# Patient Record
Sex: FEMALE | Race: WHITE | NOT HISPANIC OR LATINO | Employment: OTHER | URBAN - METROPOLITAN AREA
[De-identification: names, ages, dates, MRNs, and addresses within clinical notes are randomized per-mention and may not be internally consistent; named-entity substitution may affect disease eponyms.]

---

## 2017-02-23 ENCOUNTER — OFFICE VISIT - HEALTHEAST (OUTPATIENT)
Dept: FAMILY MEDICINE | Facility: CLINIC | Age: 57
End: 2017-02-23

## 2017-02-23 DIAGNOSIS — S83.207A ACUTE MENISCAL TEAR OF LEFT KNEE, INITIAL ENCOUNTER: ICD-10-CM

## 2017-02-23 DIAGNOSIS — Z01.818 PRE-OP EXAM: ICD-10-CM

## 2017-06-30 ENCOUNTER — RECORDS - HEALTHEAST (OUTPATIENT)
Dept: ADMINISTRATIVE | Facility: OTHER | Age: 57
End: 2017-06-30

## 2017-08-29 ENCOUNTER — OFFICE VISIT - HEALTHEAST (OUTPATIENT)
Dept: SLEEP MEDICINE | Facility: CLINIC | Age: 57
End: 2017-08-29

## 2017-08-29 DIAGNOSIS — R29.818 SUSPECTED SLEEP APNEA: ICD-10-CM

## 2017-08-29 DIAGNOSIS — G47.10 HYPERSOMNIA, UNSPECIFIED: ICD-10-CM

## 2017-08-29 DIAGNOSIS — G47.8 SLEEP DYSFUNCTION WITH SLEEP STAGE DISTURBANCE: ICD-10-CM

## 2017-08-29 DIAGNOSIS — R06.83 SNORING: ICD-10-CM

## 2017-08-29 ASSESSMENT — MIFFLIN-ST. JEOR: SCORE: 1815.1

## 2017-09-12 ENCOUNTER — RECORDS - HEALTHEAST (OUTPATIENT)
Dept: ADMINISTRATIVE | Facility: OTHER | Age: 57
End: 2017-09-12

## 2017-09-12 ENCOUNTER — RECORDS - HEALTHEAST (OUTPATIENT)
Dept: SLEEP MEDICINE | Facility: CLINIC | Age: 57
End: 2017-09-12

## 2017-09-12 DIAGNOSIS — G47.10 HYPERSOMNIA, UNSPECIFIED: ICD-10-CM

## 2017-09-12 DIAGNOSIS — R06.83 SNORING: ICD-10-CM

## 2017-09-12 DIAGNOSIS — G47.30 SLEEP APNEA, UNSPECIFIED: ICD-10-CM

## 2017-09-12 DIAGNOSIS — G47.8 OTHER SLEEP DISORDERS: ICD-10-CM

## 2017-09-14 ENCOUNTER — COMMUNICATION - HEALTHEAST (OUTPATIENT)
Dept: SLEEP MEDICINE | Facility: CLINIC | Age: 57
End: 2017-09-14

## 2017-09-14 DIAGNOSIS — R09.02 HYPOXIA: ICD-10-CM

## 2017-09-14 DIAGNOSIS — G47.33 OBSTRUCTIVE SLEEP APNEA (ADULT) (PEDIATRIC): ICD-10-CM

## 2017-09-19 ENCOUNTER — COMMUNICATION - HEALTHEAST (OUTPATIENT)
Dept: SLEEP MEDICINE | Facility: CLINIC | Age: 57
End: 2017-09-19

## 2017-10-01 ENCOUNTER — RECORDS - HEALTHEAST (OUTPATIENT)
Dept: ADMINISTRATIVE | Facility: OTHER | Age: 57
End: 2017-10-01

## 2017-10-01 ENCOUNTER — RECORDS - HEALTHEAST (OUTPATIENT)
Dept: SLEEP MEDICINE | Age: 57
End: 2017-10-01

## 2017-10-01 DIAGNOSIS — R09.02 HYPOXEMIA: ICD-10-CM

## 2017-10-01 DIAGNOSIS — G47.33 OBSTRUCTIVE SLEEP APNEA (ADULT) (PEDIATRIC): ICD-10-CM

## 2017-10-06 ENCOUNTER — AMBULATORY - HEALTHEAST (OUTPATIENT)
Dept: SLEEP MEDICINE | Facility: CLINIC | Age: 57
End: 2017-10-06

## 2017-10-06 ENCOUNTER — COMMUNICATION - HEALTHEAST (OUTPATIENT)
Dept: SLEEP MEDICINE | Facility: CLINIC | Age: 57
End: 2017-10-06

## 2017-10-06 DIAGNOSIS — G47.10 HYPERSOMNIA: ICD-10-CM

## 2017-10-06 DIAGNOSIS — G47.33 OBSTRUCTIVE SLEEP APNEA: ICD-10-CM

## 2017-10-10 ENCOUNTER — AMBULATORY - HEALTHEAST (OUTPATIENT)
Dept: SLEEP MEDICINE | Facility: CLINIC | Age: 57
End: 2017-10-10

## 2017-11-15 ENCOUNTER — OFFICE VISIT - HEALTHEAST (OUTPATIENT)
Dept: SLEEP MEDICINE | Facility: CLINIC | Age: 57
End: 2017-11-15

## 2017-11-15 DIAGNOSIS — E66.9 OBESITY: ICD-10-CM

## 2017-11-15 DIAGNOSIS — G47.8 SLEEP DYSFUNCTION WITH SLEEP STAGE DISTURBANCE: ICD-10-CM

## 2017-11-15 DIAGNOSIS — G47.33 OSA ON CPAP: ICD-10-CM

## 2017-11-15 DIAGNOSIS — G47.10 HYPERSOMNIA: ICD-10-CM

## 2017-11-15 ASSESSMENT — MIFFLIN-ST. JEOR: SCORE: 1864.09

## 2018-02-08 ENCOUNTER — OFFICE VISIT - HEALTHEAST (OUTPATIENT)
Dept: SLEEP MEDICINE | Facility: CLINIC | Age: 58
End: 2018-02-08

## 2018-02-08 DIAGNOSIS — G47.33 OSA ON CPAP: ICD-10-CM

## 2018-02-08 DIAGNOSIS — G47.10 HYPERSOMNIA: ICD-10-CM

## 2018-02-08 DIAGNOSIS — G47.8 SLEEP DYSFUNCTION WITH SLEEP STAGE DISTURBANCE: ICD-10-CM

## 2018-08-10 ENCOUNTER — RECORDS - HEALTHEAST (OUTPATIENT)
Dept: ADMINISTRATIVE | Facility: OTHER | Age: 58
End: 2018-08-10

## 2019-10-02 ENCOUNTER — RECORDS - HEALTHEAST (OUTPATIENT)
Dept: ADMINISTRATIVE | Facility: OTHER | Age: 59
End: 2019-10-02

## 2019-12-05 ENCOUNTER — RECORDS - HEALTHEAST (OUTPATIENT)
Dept: ADMINISTRATIVE | Facility: OTHER | Age: 59
End: 2019-12-05

## 2019-12-09 ENCOUNTER — TRANSFERRED RECORDS (OUTPATIENT)
Dept: HEALTH INFORMATION MANAGEMENT | Facility: CLINIC | Age: 59
End: 2019-12-09

## 2020-10-28 ENCOUNTER — RECORDS - HEALTHEAST (OUTPATIENT)
Dept: ADMINISTRATIVE | Facility: OTHER | Age: 60
End: 2020-10-28

## 2021-01-11 DIAGNOSIS — G47.10 PERSISTENT DISORDER OF INITIATING OR MAINTAINING WAKEFULNESS: ICD-10-CM

## 2021-01-11 DIAGNOSIS — G47.33 OBSTRUCTIVE SLEEP APNEA (ADULT) (PEDIATRIC): Primary | ICD-10-CM

## 2021-05-21 ENCOUNTER — RECORDS - HEALTHEAST (OUTPATIENT)
Dept: ADMINISTRATIVE | Facility: OTHER | Age: 61
End: 2021-05-21

## 2021-05-21 ENCOUNTER — HOSPITAL ENCOUNTER (EMERGENCY)
Facility: CLINIC | Age: 61
Discharge: HOME | End: 2021-05-22
Attending: EMERGENCY MEDICINE
Payer: COMMERCIAL

## 2021-05-21 DIAGNOSIS — M25.511 ACUTE PAIN OF RIGHT SHOULDER: ICD-10-CM

## 2021-05-21 PROCEDURE — 99285 EMERGENCY DEPT VISIT HI MDM: CPT | Mod: 25 | Performed by: EMERGENCY MEDICINE

## 2021-05-21 PROCEDURE — 99284 EMERGENCY DEPT VISIT MOD MDM: CPT | Performed by: EMERGENCY MEDICINE

## 2021-05-22 ENCOUNTER — APPOINTMENT (OUTPATIENT)
Dept: CT IMAGING | Facility: CLINIC | Age: 61
End: 2021-05-22
Attending: EMERGENCY MEDICINE
Payer: COMMERCIAL

## 2021-05-22 ENCOUNTER — APPOINTMENT (OUTPATIENT)
Dept: GENERAL RADIOLOGY | Facility: CLINIC | Age: 61
End: 2021-05-22
Attending: EMERGENCY MEDICINE
Payer: COMMERCIAL

## 2021-05-22 VITALS
SYSTOLIC BLOOD PRESSURE: 188 MMHG | OXYGEN SATURATION: 100 % | HEART RATE: 71 BPM | TEMPERATURE: 98 F | RESPIRATION RATE: 18 BRPM | HEIGHT: 66 IN | DIASTOLIC BLOOD PRESSURE: 97 MMHG

## 2021-05-22 PROBLEM — Z90.49 S/P LAPAROSCOPIC CHOLECYSTECTOMY: Status: ACTIVE | Noted: 2021-05-22

## 2021-05-22 PROCEDURE — 96372 THER/PROPH/DIAG INJ SC/IM: CPT | Performed by: EMERGENCY MEDICINE

## 2021-05-22 PROCEDURE — 72125 CT NECK SPINE W/O DYE: CPT

## 2021-05-22 PROCEDURE — 73030 X-RAY EXAM OF SHOULDER: CPT | Mod: RT

## 2021-05-22 PROCEDURE — 250N000012 HC RX MED GY IP 250 OP 636 PS 637: Performed by: EMERGENCY MEDICINE

## 2021-05-22 PROCEDURE — 250N000011 HC RX IP 250 OP 636: Performed by: EMERGENCY MEDICINE

## 2021-05-22 RX ORDER — HYDROCODONE BITARTRATE AND ACETAMINOPHEN 5; 325 MG/1; MG/1
1 TABLET ORAL EVERY 6 HOURS PRN
Qty: 6 TABLET | Refills: 0 | Status: SHIPPED | OUTPATIENT
Start: 2021-05-22 | End: 2021-05-25

## 2021-05-22 RX ORDER — KETOROLAC TROMETHAMINE 30 MG/ML
30 INJECTION, SOLUTION INTRAMUSCULAR; INTRAVENOUS ONCE
Status: COMPLETED | OUTPATIENT
Start: 2021-05-22 | End: 2021-05-22

## 2021-05-22 RX ORDER — ONDANSETRON 4 MG/1
4 TABLET, ORALLY DISINTEGRATING ORAL EVERY 8 HOURS PRN
Qty: 10 TABLET | Refills: 0 | Status: SHIPPED | OUTPATIENT
Start: 2021-05-22 | End: 2021-05-25

## 2021-05-22 RX ORDER — METHOCARBAMOL 500 MG/1
TABLET, FILM COATED ORAL
COMMUNITY
Start: 2021-05-06 | End: 2022-02-17

## 2021-05-22 RX ORDER — CELECOXIB 200 MG/1
CAPSULE ORAL
COMMUNITY
Start: 2021-05-06

## 2021-05-22 RX ORDER — HYDROCODONE BITARTRATE AND ACETAMINOPHEN 5; 325 MG/1; MG/1
1 TABLET ORAL EVERY 6 HOURS PRN
Status: DISCONTINUED | OUTPATIENT
Start: 2021-05-22 | End: 2021-05-22 | Stop reason: HOSPADM

## 2021-05-22 RX ORDER — TRIAMCINOLONE ACETONIDE 40 MG/ML
40 INJECTION, SUSPENSION INTRA-ARTICULAR; INTRAMUSCULAR ONCE
Status: COMPLETED | OUTPATIENT
Start: 2021-05-22 | End: 2021-05-22

## 2021-05-22 RX ORDER — PREDNISONE 20 MG/1
40 TABLET ORAL ONCE
Status: COMPLETED | OUTPATIENT
Start: 2021-05-22 | End: 2021-05-22

## 2021-05-22 RX ORDER — METHYLPREDNISOLONE 4 MG
TABLET, DOSE PACK ORAL
Qty: 21 TABLET | Refills: 0 | Status: SHIPPED | OUTPATIENT
Start: 2021-05-22 | End: 2022-02-17

## 2021-05-22 RX ADMIN — PREDNISONE 40 MG: 20 TABLET ORAL at 00:09

## 2021-05-22 RX ADMIN — TRIAMCINOLONE ACETONIDE 40 MG: 40 INJECTION, SUSPENSION INTRA-ARTICULAR; INTRAMUSCULAR at 02:02

## 2021-05-22 RX ADMIN — KETOROLAC TROMETHAMINE 30 MG: 30 INJECTION, SOLUTION INTRAMUSCULAR at 00:09

## 2021-05-22 ASSESSMENT — ENCOUNTER SYMPTOMS
FEVER: 0
SHORTNESS OF BREATH: 0
WOUND: 0

## 2021-05-22 NOTE — ED TRIAGE NOTES
Pt went to the chiropractor and has severe right arm pain. Pain in the base of the neck all the way down her right arm.

## 2021-05-22 NOTE — ED PROVIDER NOTES
History     Chief Complaint   Patient presents with     Arm Pain     HPI  Alessia Thakur is a 61 year old female who presents to the emergency department with concerns regarding right upper extremity pain.  Symptoms began all of a sudden approximately 2 hours prior to emergency department arrival.  No inciting traumatic event.  No other inciting events prior to the onset of the right upper extremity pain.  This begins approximately in the supraclavicular/right neck region, radiating all the way down the right upper extremity to include all digits, front and back in addition to the entire right arm.  No left-sided symptoms.  Patient is left-hand dominant.  Denies any chest pain.  No visual changes, or headache.  Pain is slightly worsened with turning the head to the right, with no significant worsening pain with turning the head to the left.  Patient was at the chiropractor earlier, however states that no neck adjustment was performed.  Pain is worse with touching this area, in addition to movement.  Patient does not wish to move the arm at all.    Allergies:  No Known Allergies    Problem List:    Patient Active Problem List    Diagnosis Date Noted     S/P laparoscopic cholecystectomy 05/22/2021     Priority: Medium     Tubulovillous adenoma of colon 08/24/2016     Priority: Medium     Major depressive disorder with single episode 08/02/2016     Priority: Medium     Obesity 08/02/2016     Priority: Medium        Past Medical History:    No past medical history on file.    Past Surgical History:    No past surgical history on file.    Family History:    No family history on file.    Social History:  Marital Status:   [2]  Social History     Tobacco Use     Smoking status: Not on file   Substance Use Topics     Alcohol use: Not on file     Drug use: Not on file        Medications:    HYDROcodone-acetaminophen (NORCO) 5-325 MG tablet  methylPREDNISolone (MEDROL DOSEPAK) 4 MG tablet therapy pack  ondansetron  "(ZOFRAN ODT) 4 MG ODT tab  celecoxib (CELEBREX) 200 MG capsule  FLUoxetine (PROZAC) 20 MG capsule  methocarbamol (ROBAXIN) 500 MG tablet          Review of Systems   Constitutional: Negative for fever.   Respiratory: Negative for shortness of breath.    Cardiovascular: Negative for chest pain.   Musculoskeletal:        Right shoulder pain   Skin: Negative for rash and wound.   Neurological:        See HPI       Physical Exam   BP: (!) 188/97  Pulse: 71  Temp: 98  F (36.7  C)  Resp: 18  Height: 167.6 cm (5' 6\")  SpO2: 99 %      Physical Exam  BP (!) 188/97   Pulse 71   Temp 98  F (36.7  C) (Oral)   Resp 18   Ht 1.676 m (5' 6\")   SpO2 100%   General: alert and in moderate to severe distress c/o right shoulder/arm pain  Head: atraumatic, normocephalic  Abd: nondistended  Musculoskel/Extremities: Patient holding the right arm across her body in a protected position.  With movement of the neck towards the right, patient has increased amounts of sharp, stabbing pain down the right arm.  Even with minimal palpation in the supraclavicular region, patient jumps, complaining of severe pain.  She is tender in the biceps, and triceps region.  She has normal distal sensation, perfusion, capillary refill.  She has normal strength at the elbow, however patient hesitant to move the arm whatsoever because of pain.  However, with passive range of motion is able to move the shoulder some.  There is no midline neck tenderness to palpation.  She does not have significant paraspinous muscle tenderness of the neck region either.  Otherwise able to move her range of the neck in all directions.  No scapular type pain.  Denies tenderness with palpation of the chest.  Skin: no rashes, no diaphoresis and skin color normal  Neuro: Patient awake, alert, oriented, speech is fluent, gait is normal  Psychiatric: affect/mood normal,        ED Course        Procedures               Critical Care time:  none               Results for orders " placed or performed during the hospital encounter of 05/21/21 (from the past 24 hour(s))   Cervical spine CT w/o contrast    Narrative    EXAM: CT CERVICAL SPINE W/O CONTRAST  LOCATION: [facility]  DATE/TIME: 5/22/2021 12:14 AM    INDICATION: Cervical radiculopathy, no red flags  COMPARISON: None.  TECHNIQUE: Routine CT Cervical Spine without IV contrast. Multiplanar reformats. Dose reduction techniques were used.    FINDINGS:  VERTEBRA: No acute fracture. Straightening of the normal cervical lordosis. Alignment is otherwise normal. Mild loss of disc space height at C5-C6. Moderate to marked right C3-C4 and C4-C5 facet arthropathy and lower grade changes elsewhere.    CANAL/FORAMINA: No significant canal narrowing. Mild right C3-C4 and C4-C5 degenerative foraminal narrowing.    PARASPINAL: No extraspinal abnormality.      Impression    IMPRESSION:  1.  No fracture or posttraumatic subluxation.  2.  No high-grade spinal canal or neural foraminal stenosis.   Shoulder XR, 2 view, right    Narrative    EXAM: RIGHT SHOULDER 3 VIEW  LOCATION: [facility]  DATE/TIME: 5/22/2021 1:02 AM    INDICATION: Right shoulder pain.  COMPARISON: None.      Impression    IMPRESSION: No visualized acute fracture, malalignment or other acute osseous abnormality of the right shoulder. Note that all the images of the humerus are with the arm in internal rotation, limiting the evaluation of the lateral aspect of the humeral   head.       Medications   predniSONE (DELTASONE) tablet 40 mg (40 mg Oral Given 5/22/21 0009)   ketorolac (TORADOL) injection 30 mg (30 mg Intramuscular Given 5/22/21 0009)   triamcinolone (KENALOG-40) injection 40 mg (40 mg INTRA-ARTICULAR Given 5/22/21 0202)       Assessments & Plan (with Medical Decision Making)  61 year old female left hand dominant, presenting to the emergency department with  for concerns regarding right arm pain, in addition to supraclavicular area pain.  Patient  does have sharp, stabbing type pains that involve the entire right arm.  Does not appear to have specific nerve distribution, or dermatomal distribution.  Normal  strength of the right hand, with otherwise normal strength of the right upper extremity, however limited secondary to extreme amounts of pain for the patient.  Patient without rotator cuff issues, and no prior history of surgery of the right shoulder.  She does not have any chest pains that would be more concerning for cardiac etiology, and definitely does have some tenderness to palpation with even minimal palpation of the right upper arm area, and supraclavicular region.  This does appear to be more radicular type pains, and therefore decision made after discussion with patient, in addition to  to obtain CT imaging of the cervical spine.  MRI is not available at this time of night.  CT imaging showing no obvious acute abnormality.  Mild changes noted.  No high-grade foraminal stenoses are noted.    Given the above findings, patient had received Toradol, in addition to prednisone.  Decision made for x-ray of the shoulder, and x-ray of the shoulder  shows no evidence of fracture, or other acute osseous abnormality.  Images personally reviewed, in addition to radiology interpretation.    Patient still continued in extreme amounts of pain.  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  After discussion with patient's , decision made to allow him to inject right shoulder joint.  I was present during the entire procedure.  Kenalog with 3 cc of lidocaine 1% with epinephrine, in addition to 0.5% bupivacaine were injected locally via a subacromial intra-articular injection with subacromial bursa injection.  Patient's pain level went from 8 out of 10 down to a 6 out of 10 in severity.  At this point, no further interventions  thought to be necessary at this point.  I did discharge patient home with 6 tablets Norco.  Plan is for follow-up in clinic as needed, and return if worsening symptoms.     I have reviewed the nursing notes.    I have reviewed the findings, diagnosis, plan and need for follow up with the patient.       Discharge Medication List as of 5/22/2021  1:59 AM      START taking these medications    Details   HYDROcodone-acetaminophen (NORCO) 5-325 MG tablet Take 1 tablet by mouth every 6 hours as needed for pain, Disp-6 tablet, R-0, Local Print      methylPREDNISolone (MEDROL DOSEPAK) 4 MG tablet therapy pack Follow Package Directions, Disp-21 tablet, R-0, E-Prescribe      ondansetron (ZOFRAN ODT) 4 MG ODT tab Take 1 tablet (4 mg) by mouth every 8 hours as needed, Disp-10 tablet, R-0, E-Prescribe             Final diagnoses:   Acute pain of right shoulder       5/21/2021   [facility] EMERGENCY DEPT     Jose Guadalupe Martinez MD  05/22/21 9610

## 2021-05-30 ENCOUNTER — RECORDS - HEALTHEAST (OUTPATIENT)
Dept: ADMINISTRATIVE | Facility: CLINIC | Age: 61
End: 2021-05-30

## 2021-05-31 VITALS — WEIGHT: 269.7 LBS | BODY MASS INDEX: 43.34 KG/M2 | HEIGHT: 66 IN

## 2021-05-31 VITALS — WEIGHT: 280.5 LBS | BODY MASS INDEX: 45.08 KG/M2 | HEIGHT: 66 IN

## 2021-06-01 VITALS — HEIGHT: 66 IN | BODY MASS INDEX: 45.27 KG/M2

## 2021-06-09 ENCOUNTER — OFFICE VISIT - HEALTHEAST (OUTPATIENT)
Dept: FAMILY MEDICINE | Facility: CLINIC | Age: 61
End: 2021-06-09

## 2021-06-09 DIAGNOSIS — N95.0 POSTMENOPAUSAL BLEEDING: ICD-10-CM

## 2021-06-09 DIAGNOSIS — Z12.4 SCREENING FOR CERVICAL CANCER: ICD-10-CM

## 2021-06-09 DIAGNOSIS — E66.01 MORBID OBESITY (H): ICD-10-CM

## 2021-06-09 DIAGNOSIS — Z13.220 LIPID SCREENING: ICD-10-CM

## 2021-06-09 DIAGNOSIS — Z13.1 SCREENING FOR DIABETES MELLITUS: ICD-10-CM

## 2021-06-09 DIAGNOSIS — G47.33 OSA (OBSTRUCTIVE SLEEP APNEA): ICD-10-CM

## 2021-06-09 LAB
ALBUMIN SERPL-MCNC: 3.4 G/DL (ref 3.5–5)
ALP SERPL-CCNC: 72 U/L (ref 45–120)
ALT SERPL W P-5'-P-CCNC: 30 U/L (ref 0–45)
ANION GAP SERPL CALCULATED.3IONS-SCNC: 10 MMOL/L (ref 5–18)
AST SERPL W P-5'-P-CCNC: 14 U/L (ref 0–40)
BILIRUB SERPL-MCNC: 0.4 MG/DL (ref 0–1)
BUN SERPL-MCNC: 19 MG/DL (ref 8–22)
CALCIUM SERPL-MCNC: 8.9 MG/DL (ref 8.5–10.5)
CHLORIDE BLD-SCNC: 105 MMOL/L (ref 98–107)
CHOLEST SERPL-MCNC: 195 MG/DL
CO2 SERPL-SCNC: 27 MMOL/L (ref 22–31)
CREAT SERPL-MCNC: 0.78 MG/DL (ref 0.6–1.1)
ERYTHROCYTE [DISTWIDTH] IN BLOOD BY AUTOMATED COUNT: 13.3 % (ref 11–14.5)
FASTING STATUS PATIENT QL REPORTED: YES
GFR SERPL CREATININE-BSD FRML MDRD: >60 ML/MIN/1.73M2
GLUCOSE BLD-MCNC: 99 MG/DL (ref 70–125)
HBA1C MFR BLD: 5.8 %
HCT VFR BLD AUTO: 40.6 % (ref 35–47)
HDLC SERPL-MCNC: 51 MG/DL
HGB BLD-MCNC: 12.8 G/DL (ref 12–16)
LDLC SERPL CALC-MCNC: 130 MG/DL
MCH RBC QN AUTO: 28.2 PG (ref 27–34)
MCHC RBC AUTO-ENTMCNC: 31.5 G/DL (ref 32–36)
MCV RBC AUTO: 89 FL (ref 80–100)
PLATELET # BLD AUTO: 238 THOU/UL (ref 140–440)
PMV BLD AUTO: 9.5 FL (ref 7–10)
POTASSIUM BLD-SCNC: 4.3 MMOL/L (ref 3.5–5)
PROT SERPL-MCNC: 6.1 G/DL (ref 6–8)
RBC # BLD AUTO: 4.54 MILL/UL (ref 3.8–5.4)
SODIUM SERPL-SCNC: 142 MMOL/L (ref 136–145)
TRIGL SERPL-MCNC: 72 MG/DL
TSH SERPL DL<=0.005 MIU/L-ACNC: 1 UIU/ML (ref 0.3–5)
WBC: 4.6 THOU/UL (ref 4–11)

## 2021-06-09 ASSESSMENT — ANXIETY QUESTIONNAIRES
3. WORRYING TOO MUCH ABOUT DIFFERENT THINGS: NOT AT ALL
6. BECOMING EASILY ANNOYED OR IRRITABLE: SEVERAL DAYS
7. FEELING AFRAID AS IF SOMETHING AWFUL MIGHT HAPPEN: NOT AT ALL
4. TROUBLE RELAXING: NOT AT ALL
5. BEING SO RESTLESS THAT IT IS HARD TO SIT STILL: NOT AT ALL
IF YOU CHECKED OFF ANY PROBLEMS ON THIS QUESTIONNAIRE, HOW DIFFICULT HAVE THESE PROBLEMS MADE IT FOR YOU TO DO YOUR WORK, TAKE CARE OF THINGS AT HOME, OR GET ALONG WITH OTHER PEOPLE: NOT DIFFICULT AT ALL
1. FEELING NERVOUS, ANXIOUS, OR ON EDGE: NOT AT ALL
2. NOT BEING ABLE TO STOP OR CONTROL WORRYING: NOT AT ALL
GAD7 TOTAL SCORE: 1

## 2021-06-09 ASSESSMENT — PATIENT HEALTH QUESTIONNAIRE - PHQ9: SUM OF ALL RESPONSES TO PHQ QUESTIONS 1-9: 2

## 2021-06-09 ASSESSMENT — MIFFLIN-ST. JEOR: SCORE: 1752.96

## 2021-06-09 NOTE — PROGRESS NOTES
Assessment/Plan:      Visit for Preoperative Exam.     No contraindication to surgery.  Able to perform > 4 METS.     History of postoperative nausea and vomiting.  Consider intraoperative anti-emetics.    Patient approved for surgery with general or local anesthesia. Postoperative pain to be managed by surgeon during post-operative [employer] timeframe, typically 30-60 days for major surgery, and less for others. Labs will be done as indicated. No Cardiology consultation or non-invasive testing. Low Risk Surgery. No active cardiac conditions.     Subjective:     Chief Complaint   Patient presents with     Pre-op Exam     Surgery scheduled for tomorrow 2/24/17 at [facility], having a meniscus repair done with      Scheduled Procedure: Left knee meniscus repair  Surgery Date:  2/24/17  Surgery Location:  [facility]  Surgeon:      Current Outpatient Prescriptions   Medication Sig Dispense Refill     celecoxib (CELEBREX) 200 MG capsule Take 200 mg by mouth daily as needed.        FLUoxetine (PROZAC) 20 MG capsule Take 40 mg by mouth every evening.        famotidine (PEPCID) 20 MG tablet Take 1 tablet (20 mg total) by mouth 2 (two) times a day.  0     No current facility-administered medications for this visit.        No Known Allergies    Immunization History   Administered Date(s) Administered     Hep A, Adult 10/05/2016     Hep A, historic 05/20/2011     Hep B, Adult 10/05/2016     Hep B, historic 05/20/2011     Influenza, Seasonal, Inj PF 09/12/2012     Influenza, seasonal,quad inj 36+ mos 10/05/2016     Tdap 05/13/2011     Typhoid, Inj, Inactive 05/13/2011       Patient Active Problem List   Diagnosis     Obesity     Major depressive disorder with single episode     Arthritis of knee     Tubulovillous adenoma of colon     Gallbladder polyp     S/P laparoscopic cholecystectomy       Past Medical History:   Diagnosis Date     Depression      Obesity      PONV  (postoperative nausea and vomiting)        Social History     Social History     Marital status:      Spouse name: N/A     Number of children: N/A     Years of education: N/A     Occupational History     Not on file.     Social History Main Topics     Smoking status: Never Smoker     Smokeless tobacco: Never Used     Alcohol use No     Drug use: No     Sexual activity: Not on file     Other Topics Concern     Not on file     Social History Narrative       Past Surgical History:   Procedure Laterality Date     LAPAROSCOPIC CHOLECYSTECTOMY N/A 8/24/2016    Procedure: CHOLECYSTECTOMY ;  Surgeon: Devonte Lord MD;  Location: [facility] [place];  Service:      OOPHORECTOMY Right      [place] LAP,SURG,COLECTOMY, PARTIAL, W/ANAST N/A 8/24/2016    Procedure: LAPAROSCOPIC CECECTOMY AND;  Surgeon: Devonte Lord MD;  Location: [facility];  Service: General     History of Present Illness: Left knee injury 7-10 days ago.  Was working in the home preparing to move.  Gradual pain that worsened.  Tried injection.  Had MRI this week which showed tear.      Recent Health  Fever: no  Chills: no  Fatigue: no  Chest Pain: no  Cough: no  Dyspnea: no  Urinary Frequency: no  Nausea: no  Vomiting: no  Diarrhea: no  Abdominal Pain: no  Easy Bruising: no  Lower Extremity Swelling: no  Poor Exercise Tolerance: no    Most recent Health Maintenance Visit:  8 month(s) ago    Pertinent History  Prior Anesthesia: yes  Previous Anesthesia Reaction:  no  Diabetes: no  Cardiovascular Disease: no  Pulmonary Disease: no  Renal Disease: no  GI Disease: no  Sleep Apnea: no  Thromboembolic Problems: no  Clotting Disorder: no  Bleeding Disorder: no  Transfusion Reaction: no history of blood transfusion  Impaired Immunity: no  Steroid use in the last 6 months: steroid injection into knee last week.   Frequent Aspirin use: no    No family history of anesthesia reaction, clotting disorder and bleeding disorder    Social history of patient does  not wear denture or partial plates and there are no concerns regarding care after surgery    After surgery, the patient plans to recover at home with family.    Review of Systems  Review of Systems   Constitutional: Negative.    HENT: Negative.    Eyes: Negative.    Respiratory: Negative.    Cardiovascular: Negative.    Gastrointestinal: Negative.    Endocrine: Negative.    Genitourinary: Negative.    Musculoskeletal: Negative.    Skin: Negative.    Neurological: Negative.    Hematological: Negative.    Psychiatric/Behavioral: Negative.              Objective:         Vitals:    02/23/17 1526   BP: 138/80   Pulse: 64   Resp: 16       Physical Exam:  Physical Exam   Constitutional: She appears well-developed and well-nourished.   HENT:   Right Ear: External ear normal.   Left Ear: External ear normal.   Nose: Nose normal.   Mouth/Throat: Oropharynx is clear and moist.   Eyes: Conjunctivae and EOM are normal. Pupils are equal, round, and reactive to light. Right eye exhibits no discharge. Left eye exhibits no discharge.   Neck: No thyromegaly present.   Cardiovascular: Normal rate, regular rhythm and normal heart sounds.    No murmur heard.  Pulmonary/Chest: Effort normal and breath sounds normal.   Abdominal: Soft. Bowel sounds are normal. She exhibits no distension and no mass. There is no tenderness. There is no rebound and no guarding.   Musculoskeletal: Normal range of motion.   No joint swelling or deformity.   Lymphadenopathy:     She has no cervical adenopathy.   Neurological: She is alert. She has normal reflexes.   Skin: Skin is warm and dry. No rash noted.   Psychiatric: She has a normal mood and affect.

## 2021-06-10 LAB
HPV SOURCE: NORMAL
HUMAN PAPILLOMA VIRUS 16 DNA: NEGATIVE
HUMAN PAPILLOMA VIRUS 18 DNA: NEGATIVE
HUMAN PAPILLOMA VIRUS FINAL DIAGNOSIS: NORMAL
HUMAN PAPILLOMA VIRUS OTHER HR: NEGATIVE
SPECIMEN DESCRIPTION: NORMAL

## 2021-06-12 NOTE — PROGRESS NOTES
Dear Dr. Cyndi Stone Md  [address] [place] [address]    Thank you for the opportunity to participate in the care of Mrs. Alessia Thakur.    She is a 57 y.o. female who comes to the clinic with a chief complaint of excessive daytime sleepiness that is been going on for approximately 2 years.  She has been told by her  that she has significant pauses in her breathing during sleep followed by loud snoring.  She also feels tired despite optimal hours of sleep.  Her review of system is significant for weight fluctuations.     Ideal Sleep-Wake Cycle(devoid of societal pressure):    Patient would try to initiate sleep at around midnight with a sleep latency of 2 hours. The patient would have 2 nocturnal awakening. Final wake up time is around 9 AM.    Past Medical History  Past Medical History:   Diagnosis Date     Depression      Gallbladder polyp      Obesity      PONV (postoperative nausea and vomiting)         Past Surgical History  Past Surgical History:   Procedure Laterality Date     LAPAROSCOPIC CHOLECYSTECTOMY N/A 8/24/2016    Procedure: CHOLECYSTECTOMY ;  Surgeon: Devonte Lord MD;  Location: [facility];  Service:      OOPHORECTOMY Right      [place] LAP,SURG,COLECTOMY, PARTIAL, W/ANAST N/A 8/24/2016    Procedure: LAPAROSCOPIC CECECTOMY AND;  Surgeon: Devonte Lord MD;  Location: [facility];  Service: General        Meds  Current Outpatient Prescriptions   Medication Sig Dispense Refill     celecoxib (CELEBREX) 200 MG capsule Take 200 mg by mouth daily as needed.        FLUoxetine (PROZAC) 20 MG capsule Take 40 mg by mouth every evening.        No current facility-administered medications for this visit.         Allergies  Review of patient's allergies indicates no known allergies.     Social History  Social History     Social History     Marital status:      Spouse name: N/A     Number of children: N/A     Years of education: N/A     Occupational History      Not on file.     Social History Main Topics     Smoking status: Never Smoker     Smokeless tobacco: Never Used     Alcohol use No     Drug use: No     Sexual activity: Not on file     Other Topics Concern     Not on file     Social History Narrative     Family History  No family history on file.     Review of Systems:  Constitutional: Negative except as noted in HPI.   Eyes: Negative except as noted in HPI.   ENT: Negative except as noted in HPI.   Cardiovascular: Negative except as noted in HPI.   Respiratory: Negative except as noted in HPI.   Gastrointestinal: Negative except as noted in HPI.   Genitourinary: Negative except as noted in HPI.   Musculoskeletal: Negative except as noted in HPI.   Integumentary: Negative except as noted in HPI.   Neurological: Negative except as noted in HPI.   Psychiatric: Negative except as noted in HPI.   Endocrine: Negative except as noted in HPI.   Hematologic/Lymphatic: Negative except as noted in HPI.      STOP BANG 8/29/2017   Do you snore loudly (louder than talking or loud enough to be heard through closed doors)? 1   Do you often feel tired, fatigued, or sleepy during daytime? 1   Has anyone observed you stop breathing in your sleep? 1   Do you have or are you being treated for high blood pressure? 0   BMI more than 35 kg/m2 1   Age over 50 years old? 1   Neck circumference greater than 16 inches? 0   Gender male? 0   Total Score 5   Epworths Sleepiness Scale 8/29/2017   Sitting and reading 0   Watching TV 0   Sitting, inactive in a public place (e.g. a theatre or a meeting) 0   As a passenger in a car for an hour without a break 0   Lying down to rest in the afternoon when circumstances permit 3   Sitting and talking to someone 0   Sitting quietly after a lunch without alcohol 0   In a car, while stopped for a few minutes in traffic 0   Total score 3   Rooming 8/29/2017   Usual bedtime 11   Sleep Latency 2-3   Awakenings 2   Wake Up Time 9   Energy Drinks 0   Coffee 0  "  Cola one eod   Difficulty falling asleep Yes   Difficulty staying asleep Yes   Excessive daytime tiredness Yes   Excessive daytime sleepiness Yes   Dozing off while driving No   Shift Worker No   Sleep Walking? No   Sleep Talking? No   Kicking or punching? No   Restless legs symptoms No       Physical Exam:  /82  Pulse 71  Ht 5' 6\" (1.676 m)  Wt (!) 269 lb 11.2 oz (122.3 kg)  LMP  (LMP Unknown)  SpO2 96%  BMI 43.53 kg/m2  BMI:Body mass index is 43.53 kg/(m^2).   GEN: NAD, obese  Head: Normocephalic.  EYES: PERRLA, EOMI  ENT: Oropharynx is clear, mallampatti class 4+ airway.   Nasal mucosa is moist without erythema  Neck : Thyroid is within normal limits. Neck Circ15.25\"  CV: Regular rate and rhythm, S1 & S2 positive.  LUNGS: Bilateral breathsounds heard.   ABDOMEN: Positive bowel sounds in all quadrants, soft, no rebound or guarding  MUSCULOSKELETAL: Bilateral trace leg swelling  SKIN: warm, dry, no rashes  Neurological: Alert, oriented to time, place, and person.  Psych: normal mood, normal affect     Labs/Studies:     Lab Results   Component Value Date    WBC 9.2 08/25/2016    HGB 10.9 (L) 08/25/2016    HCT 33.2 (L) 08/25/2016    MCV 85 08/25/2016     08/26/2016         Chemistry        Component Value Date/Time     08/25/2016 0602    K 4.0 08/25/2016 0602     (H) 08/25/2016 0602    CO2 30 08/25/2016 0602    BUN 7 (L) 08/25/2016 0602    CREATININE 0.76 08/25/2016 0602     08/25/2016 0602        Component Value Date/Time    CALCIUM 8.6 08/25/2016 0602    ALKPHOS 75 02/05/2016 1053    AST 16 02/05/2016 1053    ALT 21 02/05/2016 1053    BILITOT 0.3 02/05/2016 1053            No results found for: FERRITIN  No results found for: TSH      Assessment and Plan:  In summary Alessia Thakur is a 57 y.o. year old female here for sleep disturbance.  1.  Suspected sleep apnea   Mrs. Alessia Thakur has high risk for obstructive sleep apnea based on the history of witnessed apnea, snoring " and a crowded airway. I educated the patient on the underlying pathophysiology of obstructive sleep apnea. We reviewed the risks associated with sleep apnea, including increased cardiovascular risk and overall death. We talked about treatments briefly. I recommend getting a home sleep study. The patient should return to the clinic to discuss results and treatment option in a patient-centered approach.  2.  Hypersomnia  3.  Snoring  4.  Other sleep disturbance    History of cranial facial surgery?  No    Patient verbalized understanding of these issues, agrees with the plan and all questions were answered today. Patient was given an opportuntity to voice any other symptoms or concerns not listed above. Patient did not have any other symptoms or concerns.      Patient told to return in one week after the sleep study is interpreted. Patient instructed to stop at  to schedule appointment before leaving today.     Fortino Byrnes DO  Board Certified in Internal Medicine and Sleep Medicine  [facility].    (Note created with Dragon voice recognition and unintended spelling errors and word substitutions may occur)

## 2021-06-13 NOTE — PROGRESS NOTES
Patient was offered choice of vendor and chose [facility].  Patient Alessia TOMAS Comfort was set up at [facility] on 10/10/17. Patient received a Resmed [id number] Auto. Pressures were set at 6-12 CM H2O.   Patient s ramp is 6 cm H2O for Auto and FLEX/EPR is EPR.  Patient received a Resperonics Mask name: Dreamwear Gel  pillows Size Medium, heated tubing and heated humidifier.    Pacee Her

## 2021-06-13 NOTE — PROGRESS NOTES
Order for Durable Medical Equipment was processed and equipment ordered.   DME provider: [employer]  Date Faxed: 10/06/2017  Ordering Provider: Dr. Byrnes  Equipment ordered: Cpap

## 2021-06-14 NOTE — PROGRESS NOTES
Dear Dr. Cyndi Stone MD  [address] [place] [address],    Thank you for the opportunity to participate in the care of Alessia Thakur.     She is a 57 y.o.  female patient who comes to the sleep medicine clinic for review of her sleep study. The study was completed on 17 which showed the the patient had severe obstructive sleep apnea.  The patient was offered the option of getting an in lab titration study which she agreed to.  He then subsequently was started on pressure therapy.  This is the patient's first clinical visit since starting pressure therapy.  The patient states that her sleep quality has improved along with her energy level.  She is ripping the mask off more in her sleep.  However she rates her overall CPAP usage experience as a positive one.  She also complains of waking up with a dry throat.    Compliance Download data for 30 days:  Pressure settin-16 CWP  Residual AHI: 1.5 events per hour  Leak: Minimal  Compliance: 50%  Mask Tolerance: Good  Skin irritation: None    Past Medical History:   Diagnosis Date     Depression      Gallbladder polyp      Obesity      PONV (postoperative nausea and vomiting)        Past Surgical History:   Procedure Laterality Date     LAPAROSCOPIC CHOLECYSTECTOMY N/A 2016    Procedure: CHOLECYSTECTOMY ;  Surgeon: Devonte Lord MD;  Location: [facility];  Service:      OOPHORECTOMY Right      HI LAP,SURG,COLECTOMY, PARTIAL, W/ANAST N/A 2016    Procedure: LAPAROSCOPIC CECECTOMY AND;  Surgeon: Devonte Lord MD;  Location: [facility];  Service: General       Social History     Social History     Marital status:      Spouse name: N/A     Number of children: N/A     Years of education: N/A     Occupational History     Not on file.     Social History Main Topics     Smoking status: Never Smoker     Smokeless tobacco: Never Used     Alcohol use No     Drug use: No     Sexual activity: Not on file     Other  "Topics Concern     Not on file     Social History Narrative       Current Outpatient Prescriptions   Medication Sig Dispense Refill     celecoxib (CELEBREX) 200 MG capsule Take 200 mg by mouth daily as needed.        FLUoxetine (PROZAC) 20 MG capsule Take 40 mg by mouth every evening.        No current facility-administered medications for this visit.        No Known Allergies    Physical Exam:  /82  Pulse 78  Ht 5' 6\" (1.676 m)  Wt (!) 280 lb 8 oz (127.2 kg)  LMP  (LMP Unknown)  SpO2 96%  BMI 45.27 kg/m2  BMI:Body mass index is 45.27 kg/(m^2).   GEN: NAD, obese  Psych: normal mood, normal affect     Labs/Studies:  - We reviewed the results of the overnight PSG as described on the HPI.     Lab Results   Component Value Date    WBC 9.2 08/25/2016    HGB 10.9 (L) 08/25/2016    HCT 33.2 (L) 08/25/2016    MCV 85 08/25/2016     08/26/2016         Chemistry        Component Value Date/Time     08/25/2016 0602    K 4.0 08/25/2016 0602     (H) 08/25/2016 0602    CO2 30 08/25/2016 0602    BUN 7 (L) 08/25/2016 0602    CREATININE 0.76 08/25/2016 0602     08/25/2016 0602        Component Value Date/Time    CALCIUM 8.6 08/25/2016 0602    ALKPHOS 75 02/05/2016 1053    AST 16 02/05/2016 1053    ALT 21 02/05/2016 1053    BILITOT 0.3 02/05/2016 1053            No results found for: FERRITIN        Assessment and Plan:  In summary Alessia Thakur is a 57 y.o. year old female here for review of her sleep study and compliance download data.  1. Obstructive Sleep Apnea  Reviewed the results of the study with the patient in detail.  I encouraged her to try to undergo pressure desensitization protocol to increase her hours of usage.  I will keep her at the same pressure settings for now due to the fact that she still has difficulty with consistent usage.  I also recommended a chinstrap to mitigate dry mouth.  2.  Hypersomnia  Improved  3.  Other sleep disturbance  4.  Obesity     Patient verbalized " understanding of these issues, agrees with the plan and all questions were answered today. Patient was given an opportuntity to voice any other symptoms or concerns not listed above. Patient did not have any other symptoms or concerns.      Patient told to return in 6 weeks. Patient instructed to stop at  to schedule appointment before leaving today.    Fortino Byrnes DO  Board Certified in Internal Medicine and Sleep Medicine  [facility].    We spent a total of 25 minutes of face-to-face encounter and more than 50% of the encounter was used for counseling or coordination of care.    (Note created with Dragon voice recognition and unintended spelling errors and word substitutions may occur)

## 2021-06-15 NOTE — PROGRESS NOTES
Dear Dr. Cyndi Stone MD  [address] [place] [address],    Thank you for the opportunity to participate in the care of Alessia Thakur.     She is a 57 y.o. y/o female patient who comes to the sleep medicine clinic for follow up.  Since the last clinical visit with me, the patient has been able to increase her hours of usage a little bit more.  As a result she states that her sleep quality and energy level have both improved.  She would be interested in increasing her starting pressure slightly.    Compliance Download data for 30 days:  Pressure settin-16 CWP  Residual AHI: 0.8 events per hour  Leak: Minimal  Compliance: 60%  Mask Tolerance: Good  Skin irritation: None      Past Medical History:   Diagnosis Date     Depression      Gallbladder polyp      Obesity      PONV (postoperative nausea and vomiting)        Past Surgical History:   Procedure Laterality Date     LAPAROSCOPIC CHOLECYSTECTOMY N/A 2016    Procedure: CHOLECYSTECTOMY ;  Surgeon: Devonte Lord MD;  Location: [facility];  Service:      OOPHORECTOMY Right      CO LAP,SURG,COLECTOMY, PARTIAL, W/ANAST N/A 2016    Procedure: LAPAROSCOPIC CECECTOMY AND;  Surgeon: Devonte Lord MD;  Location: [facility];  Service: General       Social History     Social History     Marital status:      Spouse name: N/A     Number of children: N/A     Years of education: N/A     Occupational History     Not on file.     Social History Main Topics     Smoking status: Never Smoker     Smokeless tobacco: Never Used     Alcohol use No     Drug use: No     Sexual activity: Not on file     Other Topics Concern     Not on file     Social History Narrative         Current Outpatient Prescriptions   Medication Sig Dispense Refill     celecoxib (CELEBREX) 200 MG capsule Take 200 mg by mouth daily as needed.        FLUoxetine (PROZAC) 20 MG capsule Take 40 mg by mouth every evening.        No current facility-administered  "medications for this visit.        No Known Allergies    Physical Exam:  /82  Pulse 67  Ht 5' 6\" (1.676 m)  LMP  (LMP Unknown)  SpO2 95%  BMI:There is no height or weight on file to calculate BMI.   GEN: NAD,   Psych: normal mood, normal affect    Labs/Studies:     I reviewed the efficacy and compliance report from his device. Data summarized on the HPI and the CPAP compliance flow sheet.     Lab Results   Component Value Date    WBC 9.2 08/25/2016    HGB 10.9 (L) 08/25/2016    HCT 33.2 (L) 08/25/2016    MCV 85 08/25/2016     08/26/2016         Chemistry        Component Value Date/Time     08/25/2016 0602    K 4.0 08/25/2016 0602     (H) 08/25/2016 0602    CO2 30 08/25/2016 0602    BUN 7 (L) 08/25/2016 0602    CREATININE 0.76 08/25/2016 0602     08/25/2016 0602        Component Value Date/Time    CALCIUM 8.6 08/25/2016 0602    ALKPHOS 75 02/05/2016 1053    AST 16 02/05/2016 1053    ALT 21 02/05/2016 1053    BILITOT 0.3 02/05/2016 1053            No results found for: FERRITIN         Assessment and Plan:  In summary Alessia Thakur is a 57 y.o. year old female who is here for compliance download review.    1.  Obstructive sleep apnea on CPAP  As per patient's request I will increase her pressure range to 8-16 CWP remotely.  I encouraged to try to increase her hours of usage to the best of her ability.  2.  Hypersomnia  Improved  3.  Other sleep disturbance     Patient verbalized understanding of these issues, agrees with the plan and all questions were answered today. Patient was given an opportuntity to voice any other symptoms or concerns not listed above. Patient did not have any other symptoms or concerns.      Patient told to return in 6 weeks. Patient instructed to stop at  to schedule appointment before leaving today.    Fortino Byrnes DO  Board Certified in Internal Medicine and Sleep Medicine  [facility].    I spent a total of 15 minutes of " face-to-face encounter and more than 50% of the encounter was used for counseling or coordination of care.    (Note created with Dragon voice recognition and unintended spelling errors and word substitutions may occur)

## 2021-06-16 LAB
BKR LAB AP ABNORMAL BLEEDING: YES
BKR LAB AP BIRTH CONTROL/HORMONES: NORMAL
BKR LAB AP CERVICAL APPEARANCE: NORMAL
BKR LAB AP GYN ADEQUACY: NORMAL
BKR LAB AP GYN INTERPRETATION: NORMAL
BKR LAB AP HPV REFLEX: NORMAL
BKR LAB AP LMP: NORMAL
BKR LAB AP PATIENT STATUS: NORMAL
BKR LAB AP PREVIOUS ABNORMAL: NORMAL
BKR LAB AP PREVIOUS NORMAL: 2016
HIGH RISK?: NO
PATH REPORT.COMMENTS IMP SPEC: NORMAL
RESULT FLAG (HE HISTORICAL CONVERSION): NORMAL

## 2021-06-21 ENCOUNTER — HOSPITAL ENCOUNTER (OUTPATIENT)
Dept: ULTRASOUND IMAGING | Facility: CLINIC | Age: 61
Discharge: HOME | End: 2021-06-21
Attending: FAMILY MEDICINE
Payer: COMMERCIAL

## 2021-06-21 DIAGNOSIS — N95.0 POSTMENOPAUSAL BLEEDING: ICD-10-CM

## 2021-06-26 NOTE — PROGRESS NOTES
"Assessment/Plan:     Problem List Items Addressed This Visit        Edg Concept Cardiac Problems    NICKY (obstructive sleep apnea)       Other    Morbid obesity (H)    Relevant Orders    Comprehensive Metabolic Panel    Thyroid Naturita      Other Visit Diagnoses     Postmenopausal bleeding    -  Primary    Relevant Orders    US Pelvis With Transvaginal Non OB    HM2(CBC w/o Differential)    Screening for diabetes mellitus        Relevant Orders    Glycosylated Hemoglobin A1c    Lipid screening        Relevant Orders    Lipid Profile        Discussed postmenopausal bleeding and the importance of a comprehensive work-up.  I ordered a pelvic ultrasound and will get back to her with those results.  Depending on the results, we will proceed with an endometrial biopsy either by a gynecologist or here at the clinic with Dr. Alston.    Subjective:       61 y.o. female presents for evaluation of postmenopausal bleeding.  The patient reports that she was placed on a Medrol Dosepak and wonders if that could be a contributing factor.  She started having bleeding similar to a period about 5 days ago.  She has had some cramping with that as well.  The patient is also requesting an updated Pap smear.  She was diagnosed with obstructive sleep apnea since her last visit and does use her CPAP machine on a nightly basis.      Reviewed: The following portions of the patient's history were reviewed and updated as appropriate: allergies, current medications, past family history, past medical history, past social history, past surgical history and problem list.    Review of Systems  Pertinent items are noted in HPI.        Objective:     /68 (Patient Site: Left Arm, Patient Position: Sitting, Cuff Size: Adult Large)   Pulse 62   Ht 5' 6\" (1.676 m)   Wt (!) 256 lb (116.1 kg)   LMP  (LMP Unknown)   SpO2 97%   BMI 41.32 kg/m    General appearance: alert, appears stated age and cooperative  Pelvic: cervix normal in appearance, " external genitalia normal and some bright red blood in vagina      This note has been dictated using voice recognition software. Any grammatical or context distortions are unintentional and inherent to the software

## 2021-06-30 ENCOUNTER — COMMUNICATION - HEALTHEAST (OUTPATIENT)
Dept: ADMINISTRATIVE | Facility: CLINIC | Age: 61
End: 2021-06-30

## 2021-06-30 DIAGNOSIS — R73.03 PREDIABETES: ICD-10-CM

## 2021-06-30 DIAGNOSIS — E66.01 MORBID OBESITY (H): ICD-10-CM

## 2021-07-04 NOTE — TELEPHONE ENCOUNTER
Telephone Encounter by Leanne Muñoz, PharmPABLO at 7/1/2021  1:42 PM     Author: Leanne Muñoz, PharmPABLO Service: -- Author Type: Pharmacist    Filed: 7/1/2021  1:44 PM Encounter Date: 6/25/2021 Status: Signed    : Leanne Muñoz, PharmPABLO (Pharmacist)       I called in the voucher and the price dropped to $0 for a month. Called Alessia and we decided that she will try the Trulicity see if she likes it, then after a month we can switch her to Ozempic or continue Trulicity but she is aware that she will need to pay a large cost.  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  She plans on starting it tonight.  I will call her in a few weeks to verify tolerance.

## 2021-07-04 NOTE — TELEPHONE ENCOUNTER
Telephone Encounter by Leanne Muñoz PharmD at 6/30/2021 12:54 PM     Author: Leanne Muñoz PharmD Service: -- Author Type: Pharmacist    Filed: 6/30/2021 12:55 PM Encounter Date: 6/25/2021 Status: Signed    : Leanne Muñoz PharmD (Pharmacist)       Trulicity was $693. With the voucher it would drop it only $150. I can call in a voucher for $0 one month, but I will have to do it tomorrow. I called her to let her know, no answer, left voicemail.

## 2021-07-06 VITALS
HEIGHT: 66 IN | SYSTOLIC BLOOD PRESSURE: 132 MMHG | BODY MASS INDEX: 41.14 KG/M2 | WEIGHT: 256 LBS | OXYGEN SATURATION: 97 % | DIASTOLIC BLOOD PRESSURE: 68 MMHG | HEART RATE: 62 BPM

## 2021-07-06 ASSESSMENT — PATIENT HEALTH QUESTIONNAIRE - PHQ9: SUM OF ALL RESPONSES TO PHQ QUESTIONS 1-9: 2

## 2021-07-07 NOTE — TELEPHONE ENCOUNTER
I called Alessia to discuss. She is not sure about her insurance and whether she has a deductible.    Reviewed that likely the price is so expensive because she has a high deductible, she will need to meet that before the Ozempic is covered.  Reviewed that unfortunately the coupons will only check the price by $150, therefore it will still be unaffordable.  We could see if Trulicity would be cheaper for her, I will send a prescription and verify price tomorrow.  Possibly there is a preferred product?  If not covered well, we could do a 1 month free trial to see if this is effective for her, I will call her tomorrow with more info.

## 2021-07-07 NOTE — TELEPHONE ENCOUNTER
Reason for Call:  Other call back      Detailed comments: Pt is calling because the semaglutide 0.25 mg or 0.5 mg(2 mg/1.5 mL) PnIj is $850. She is calling back to see if there are coupons or how the price can be reduced. Dr. Stone did make her aware that the price was going to be expensive but there might be some options to reduce the cost.    Phone Number Patient can be reached at:   Cell number on file:    Telephone Information:   Mobile [phone]       Best Time:     Can we leave a detailed message on this number?: Yes    Call taken on 6/25/2021 at 1:11 PM by July De Leon

## 2021-07-08 ASSESSMENT — ANXIETY QUESTIONNAIRES: GAD7 TOTAL SCORE: 1

## 2021-08-18 ENCOUNTER — TELEPHONE (OUTPATIENT)
Dept: PHARMACY | Facility: CLINIC | Age: 61
End: 2021-08-18

## 2021-08-18 NOTE — TELEPHONE ENCOUNTER
Called Alessia to follow up -- she never used the Trulicity. Reports that her and her  went back and forth and ultimately decided to not use it. They did some research online and she was concerned about side effects and being on it forever. The price was also high.     I did review that in general most patients do well and I would be hesitant about what she reads online. I did encourage her to look at her insurance plan and consider an alternative plan with a better deductible in the future. I also reviewed to follow up with Dr. Stone to discuss further.

## 2021-09-11 ENCOUNTER — HEALTH MAINTENANCE LETTER (OUTPATIENT)
Age: 61
End: 2021-09-11

## 2022-02-07 ENCOUNTER — TELEPHONE (OUTPATIENT)
Dept: SLEEP MEDICINE | Facility: CLINIC | Age: 62
End: 2022-02-07
Payer: COMMERCIAL

## 2022-02-07 DIAGNOSIS — G47.33 OBSTRUCTIVE SLEEP APNEA (ADULT) (PEDIATRIC): Primary | ICD-10-CM

## 2022-02-07 NOTE — TELEPHONE ENCOUNTER
Reason for call:  Other   Patient called regarding (reason for call): prescription  Additional comments: Needs new prescription for cpap supplies, has appt schedule in May, is wondering if able to get prescription approved prior to appointment    Phone number to reach patient:  Cell number on file:    Telephone Information:   Mobile [phone]       Best Time:  any    Can we leave a detailed message on this number?  YES    Travel screening: Not Applicable

## 2022-02-17 ENCOUNTER — OFFICE VISIT (OUTPATIENT)
Dept: FAMILY MEDICINE | Facility: CLINIC | Age: 62
End: 2022-02-17
Payer: COMMERCIAL

## 2022-02-17 VITALS
WEIGHT: 279.9 LBS | SYSTOLIC BLOOD PRESSURE: 154 MMHG | HEART RATE: 66 BPM | BODY MASS INDEX: 45.18 KG/M2 | OXYGEN SATURATION: 97 % | DIASTOLIC BLOOD PRESSURE: 82 MMHG

## 2022-02-17 DIAGNOSIS — N95.0 POSTMENOPAUSAL BLEEDING: Primary | ICD-10-CM

## 2022-02-17 DIAGNOSIS — R03.0 ELEVATED BLOOD PRESSURE READING WITHOUT DIAGNOSIS OF HYPERTENSION: ICD-10-CM

## 2022-02-17 DIAGNOSIS — E66.01 MORBID OBESITY (H): ICD-10-CM

## 2022-02-17 DIAGNOSIS — Z11.4 SCREENING FOR HIV (HUMAN IMMUNODEFICIENCY VIRUS): ICD-10-CM

## 2022-02-17 DIAGNOSIS — Z11.59 NEED FOR HEPATITIS C SCREENING TEST: ICD-10-CM

## 2022-02-17 PROCEDURE — 99213 OFFICE O/P EST LOW 20 MIN: CPT | Performed by: FAMILY MEDICINE

## 2022-02-17 ASSESSMENT — PATIENT HEALTH QUESTIONNAIRE - PHQ9
SUM OF ALL RESPONSES TO PHQ QUESTIONS 1-9: 4
SUM OF ALL RESPONSES TO PHQ QUESTIONS 1-9: 4
10. IF YOU CHECKED OFF ANY PROBLEMS, HOW DIFFICULT HAVE THESE PROBLEMS MADE IT FOR YOU TO DO YOUR WORK, TAKE CARE OF THINGS AT HOME, OR GET ALONG WITH OTHER PEOPLE: NOT DIFFICULT AT ALL

## 2022-02-18 ASSESSMENT — PATIENT HEALTH QUESTIONNAIRE - PHQ9: SUM OF ALL RESPONSES TO PHQ QUESTIONS 1-9: 4

## 2022-02-21 PROBLEM — R03.0 ELEVATED BLOOD PRESSURE READING WITHOUT DIAGNOSIS OF HYPERTENSION: Status: ACTIVE | Noted: 2022-02-21

## 2022-02-21 NOTE — ASSESSMENT & PLAN NOTE
The patient's last 2 blood pressures have been moderate to more significantly elevated.  We discussed that starting on a medication such as lisinopril 10 mg daily would be my suggestion, however, she would like an opportunity to work further on lifestyle changes and see if she can get the blood pressure down actually on her own.  We talked about the importance of close monitoring and committing to treatment if this has not improved in the next 3 to 6 months.

## 2022-02-21 NOTE — PROGRESS NOTES
"Problem List Items Addressed This Visit        Digestive    Morbid obesity (H)     Discussed weight management and resources to assist with this.            Other    Elevated blood pressure reading without diagnosis of hypertension     The patient's last 2 blood pressures have been moderate to more significantly elevated.  We discussed that starting on a medication such as lisinopril 10 mg daily would be my suggestion, however, she would like an opportunity to work further on lifestyle changes and see if she can get the blood pressure down actually on her own.  We talked about the importance of close monitoring and committing to treatment if this has not improved in the next 3 to 6 months.           Other Visit Diagnoses     Postmenopausal bleeding    -  Primary    Relevant Orders    Ob/Gyn Referral    Screening for HIV (human immunodeficiency virus)        Need for hepatitis C screening test            We discussed the importance of an endometrial biopsy at this point and she completely understands and a referral was placed.  We also discussed her elevated blood pressure and the need for close follow-up on that since she is electing not to initiate treatment.    THONG Aggarwal is a 61 year old who presents today for postmenopausal bleeding.  This is recurrent from about 6 months ago when she had this for the first time.  Its been several years since she has had her last true menstrual cycle.  The patient had a pelvic ultrasound with the initial bleeding episode and it came back normal.  We had a discussion regarding endometrial biopsy, and the patient expressed interest in not pursuing this unless absolutely necessary.  We read and \"up-to-date\" article that suggested that with a normal pelvic ultrasound that it is reasonable to monitor closely in any further bleeding would require proceeding to her endometrial biopsy.  The patient has not had any bleeding up until this past week.  The " bleeding is a bit more heavy this time.  In addition, the patient describes feeling PMS-like symptoms just prior to the onset of the bleeding.     Objective    BP (!) 154/82 (BP Location: Left arm, Patient Position: Left side, Cuff Size: Adult Large)   Pulse 66   Wt 127 kg (279 lb 14.4 oz)   SpO2 97%   BMI 45.18 kg/m    Body mass index is 45.18 kg/m .  Physical Exam   GENERAL: healthy, alert and no distress            Answers for HPI/ROS submitted by the patient on 2/17/2022  If you checked off any problems, how difficult have these problems made it for you to do your work, take care of things at home, or get along with other people?: Not difficult at all  PHQ9 TOTAL SCORE: 4  How many servings of fruits and vegetables do you eat daily?: 4 or more  On average, how many sweetened beverages do you drink each day (Examples: soda, juice, sweet tea, etc.  Do NOT count diet or artificially sweetened beverages)?: 0  How many minutes a day do you exercise enough to make your heart beat faster?: 20 to 29  How many days a week do you exercise enough to make your heart beat faster?: 5  How many days per week do you miss taking your medication?: 0  What is the reason for your visit today?: Menstrual period  When did your symptoms begin?: 3-7 days ago

## 2022-03-09 ENCOUNTER — TRANSFERRED RECORDS (OUTPATIENT)
Dept: HEALTH INFORMATION MANAGEMENT | Facility: CLINIC | Age: 62
End: 2022-03-09
Payer: COMMERCIAL

## 2022-03-10 ENCOUNTER — TRANSFERRED RECORDS (OUTPATIENT)
Dept: HEALTH INFORMATION MANAGEMENT | Facility: CLINIC | Age: 62
End: 2022-03-10
Payer: COMMERCIAL

## 2022-05-09 ENCOUNTER — VIRTUAL VISIT (OUTPATIENT)
Dept: SLEEP MEDICINE | Facility: CLINIC | Age: 62
End: 2022-05-09
Payer: COMMERCIAL

## 2022-05-09 VITALS — HEIGHT: 66 IN | WEIGHT: 260 LBS | BODY MASS INDEX: 41.78 KG/M2

## 2022-05-09 DIAGNOSIS — G47.33 OBSTRUCTIVE SLEEP APNEA: Primary | ICD-10-CM

## 2022-05-09 PROCEDURE — 99203 OFFICE O/P NEW LOW 30 MIN: CPT | Mod: 95 | Performed by: INTERNAL MEDICINE

## 2022-05-09 ASSESSMENT — SLEEP AND FATIGUE QUESTIONNAIRES
HOW LIKELY ARE YOU TO NOD OFF OR FALL ASLEEP WHILE SITTING INACTIVE IN A PUBLIC PLACE: WOULD NEVER DOZE
HOW LIKELY ARE YOU TO NOD OFF OR FALL ASLEEP WHILE SITTING QUIETLY AFTER LUNCH WITHOUT ALCOHOL: WOULD NEVER DOZE
HOW LIKELY ARE YOU TO NOD OFF OR FALL ASLEEP WHILE LYING DOWN TO REST IN THE AFTERNOON WHEN CIRCUMSTANCES PERMIT: HIGH CHANCE OF DOZING
HOW LIKELY ARE YOU TO NOD OFF OR FALL ASLEEP IN A CAR, WHILE STOPPED FOR A FEW MINUTES IN TRAFFIC: WOULD NEVER DOZE
HOW LIKELY ARE YOU TO NOD OFF OR FALL ASLEEP WHEN YOU ARE A PASSENGER IN A CAR FOR AN HOUR WITHOUT A BREAK: WOULD NEVER DOZE
HOW LIKELY ARE YOU TO NOD OFF OR FALL ASLEEP WHILE SITTING AND TALKING TO SOMEONE: WOULD NEVER DOZE
HOW LIKELY ARE YOU TO NOD OFF OR FALL ASLEEP WHILE SITTING AND READING: WOULD NEVER DOZE
HOW LIKELY ARE YOU TO NOD OFF OR FALL ASLEEP WHILE WATCHING TV: WOULD NEVER DOZE

## 2022-05-09 NOTE — PROGRESS NOTES
Alessia is a 62 year old who is being evaluated via a billable video visit.      How would you like to obtain your AVS? Mail a copy  If the video visit is dropped, the invitation should be resent by: Send to e-mail at: [email or web address]  Will anyone else be joining your video visit? Mariely Henson    Video Start Time: 2:58 PM  Video-Visit Details    Type of service:  Video Visit    Video End Time:3:08 PM    Originating Location (pt. Location): Home    Distant Location (provider location):  [facility]     Platform used for Video Visit: Comprehensive Care     Additional 10 minutes on the date of service was spent performing the following:    -Preparing to see the patient  -Ordering medications, tests, or procedures   -Documenting clinical information in the electronic or other health record     Thank you for the opportunity to participate in the care of Alessia Thakur.     She is a 62 year old y/o female patient who comes to the sleep medicine clinic for follow up.  The patient was diagnosed with NICKY on 09/12/17 (AHI=34.1). The patient states that she is still getting benefit from CPAP therapy and has no complaints.      Assessment and Plan:  In summary Alessia Thakur is a 62 year old year old female who is here for follow up.    1. Obstructive sleep apnea  I congratulated the patient on her excellent CPAP usage. I will keep her on the same pressure range for now. I look forward to following up with the patient on an annual basis.  - COMPREHENSIVE DME     Compliance Download data for 30 Days:  Pressure setting:APAP 8-16 cwp  95% pressure:11.7 cwp  Residual AHI:0.5 events per hour   Leak:Minimal  Compliance:100%  Mask Tolerance:Good. Nasal pillows  Skin irritation:None  DME:[facility]    Lab reviewed: Discussed with patient.    Sleep-Wake Cycle:    The patient likes to initiate sleep at around 11 PM with a sleep latency of less than 20 minutes. The patient has zero nocturnal awakenings. Final  wake up time is around 8-9 AM.    KIMBERLY:  KIMBERLY Total Score: 8  Total score - Oakes: 3 (5/9/2022  2:36 PM)        Patient Active Problem List   Diagnosis     Major depressive disorder with single episode     Obesity     S/P laparoscopic cholecystectomy     Tubulovillous adenoma of colon     Morbid obesity (H)     Elevated blood pressure reading without diagnosis of hypertension       Past Medical History:   Diagnosis Date     Depression      Gallbladder polyp      Obesity      NICKY (obstructive sleep apnea) 6/9/2021     PONV (postoperative nausea and vomiting)        Past Surgical History:   Procedure Laterality Date     LAPAROSCOPIC CHOLECYSTECTOMY N/A 8/24/2016    Procedure: CHOLECYSTECTOMY ;  Surgeon: Devonte Lord MD;  Location: [facility];  Service:      OOPHORECTOMY Right      MS LAP,SURG,COLECTOMY, PARTIAL, W/ANAST N/A 8/24/2016    Procedure: LAPAROSCOPIC CECECTOMY AND;  Surgeon: Devnote Lord MD;  Location: [facility];  Service: General       Social History     Socioeconomic History     Marital status:      Spouse name: Not on file     Number of children: Not on file     Years of education: Not on file     Highest education level: Not on file   Occupational History     Not on file   Tobacco Use     Smoking status: Never Smoker     Smokeless tobacco: Never Used   Substance and Sexual Activity     Alcohol use: No     Drug use: No     Sexual activity: Not on file   Other Topics Concern     Not on file   Social History Narrative     Not on file     Social Determinants of Health     Financial Resource Strain: Not on file   Food Insecurity: Not on file   Transportation Needs: Not on file   Physical Activity: Not on file   Stress: Not on file   Social Connections: Not on file   Intimate Partner Violence: Not on file   Housing Stability: Not on file       Current Outpatient Medications   Medication Sig Dispense Refill     celecoxib (CELEBREX) 200 MG capsule        FLUoxetine (PROZAC) 20 MG  capsule          No Known Allergies    Physical Exam:  GEN: NAD,  Psych: normal mood, normal affect    Labs/Studies:      No results found for: PH, PHARTERIAL, PO2, [id number], SAT, PCO2, HCO3, BASEEXCESS, DARIUS, BEB  Lab Results   Component Value Date    TSH 1.00 06/09/2021     Lab Results   Component Value Date    GLC 99 06/09/2021     Lab Results   Component Value Date    HGB 12.8 06/09/2021     Lab Results   Component Value Date    BUN 19 06/09/2021    CR 0.78 06/09/2021    CR 0.75 08/24/2016     Lab Results   Component Value Date    AST 14 06/09/2021    ALT 30 06/09/2021    ALKPHOS 72 06/09/2021    BILITOTAL 0.4 06/09/2021     No results found for: UAMP, UBARB, BENZODIAZEUR, UCANN, UCOC, OPIT, UPCP    Recent Labs   Lab Test 06/09/21  0951 08/24/16  1511     --    POTASSIUM 4.3  --    CHLORIDE 105  --    CO2 27  --    ANIONGAP 10  --    GLC 99  --    BUN 19  --    CR 0.78 0.75   SOPHIA 8.9  --        No results found for: MARCOS    I reviewed the efficacy and compliance report from her device. Data summarized on the HPI and the PAP compliance flow sheet.     Patient verbalized understanding of these issues, agrees with the plan and all questions were answered today. Patient was given an opportuntity to voice any other symptoms or concerns not listed above. Patient did not have any other symptoms or concerns.      Fortino Byrnes DO  Board Certified in Internal Medicine and Sleep Medicine    (Note created with Dragon voice recognition and unintended spelling errors and word substitutions may occur)     Audio and visual devices were used for this virtual clinic visit with permission from patient.

## 2022-05-27 NOTE — NURSING NOTE
1 year appointment reminder message was created and will be sent out 2 - 3 months prior to next appointment due date. Jelena Patiño, [facility]

## 2022-06-17 ENCOUNTER — TELEPHONE (OUTPATIENT)
Dept: FAMILY MEDICINE | Facility: CLINIC | Age: 62
End: 2022-06-17
Payer: COMMERCIAL

## 2022-06-17 DIAGNOSIS — I10 BENIGN ESSENTIAL HYPERTENSION: Primary | ICD-10-CM

## 2022-06-17 RX ORDER — LISINOPRIL 10 MG/1
10 TABLET ORAL DAILY
Qty: 30 TABLET | Refills: 1 | Status: SHIPPED | OUTPATIENT
Start: 2022-06-17 | End: 2022-07-11

## 2022-06-17 NOTE — TELEPHONE ENCOUNTER
Pt calling stating saw you in Feb of this year and you offered her BP meds lisinopril 10 mg  At that time it was declined she was going to try life style changes her BP was 170/79 yesterday prior to a procedure for injection for herniated disc in back  She is now requesting rx be sent to pharm on record and she will start medication.  Please advise if you will send in for her.

## 2022-07-09 DIAGNOSIS — I10 BENIGN ESSENTIAL HYPERTENSION: ICD-10-CM

## 2022-07-10 NOTE — TELEPHONE ENCOUNTER
"Routing refill request to provider for review/approval because:  Labs not current:  bp out of range    Last Written Prescription Date:  6/17/22  Last Fill Quantity: 30,  # refills: 1   Last office visit provider:  2/17/22     Requested Prescriptions   Pending Prescriptions Disp Refills     lisinopril (ZESTRIL) 10 MG tablet [Pharmacy Med Name: LISINOPRIL 10 MG TABLET] 30 tablet 1     Sig: TAKE 1 TABLET (10 MG) BY MOUTH DAILY.       ACE Inhibitors (Including Combos) Protocol Failed - 7/9/2022  7:31 AM        Failed - Blood pressure under 140/90 in past 12 months     BP Readings from Last 3 Encounters:   02/17/22 (!) 154/82   06/24/21 138/78   06/09/21 132/68                 Failed - Normal serum creatinine on file in past 12 months     Recent Labs   Lab Test 06/09/21  0951   CR 0.78       Ok to refill medication if creatinine is low          Failed - Normal serum potassium on file in past 12 months     Recent Labs   Lab Test 06/09/21  0951   POTASSIUM 4.3             Passed - Recent (12 mo) or future (30 days) visit within the authorizing provider's specialty     Patient has had an office visit with the authorizing provider or a provider within the authorizing providers department within the previous 12 mos or has a future within next 30 days. See \"Patient Info\" tab in inbasket, or \"Choose Columns\" in Meds & Orders section of the refill encounter.              Passed - Medication is active on med list        Passed - Patient is age 18 or older        Passed - No active pregnancy on record        Passed - No positive pregnancy test within past 12 months             Mariia Waters RN 07/10/22 12:04 PM  " no previous reaction

## 2022-07-11 RX ORDER — LISINOPRIL 10 MG/1
10 TABLET ORAL DAILY
Qty: 30 TABLET | Refills: 1 | Status: SHIPPED | OUTPATIENT
Start: 2022-07-11 | End: 2022-09-15

## 2022-07-21 ENCOUNTER — TELEPHONE (OUTPATIENT)
Dept: FAMILY MEDICINE | Facility: CLINIC | Age: 62
End: 2022-07-21

## 2022-07-21 DIAGNOSIS — I10 BENIGN ESSENTIAL HYPERTENSION: Primary | ICD-10-CM

## 2022-07-21 RX ORDER — LOSARTAN POTASSIUM 100 MG/1
100 TABLET ORAL DAILY
Qty: 30 TABLET | Refills: 1 | Status: SHIPPED | OUTPATIENT
Start: 2022-07-21 | End: 2022-09-05

## 2022-07-21 NOTE — TELEPHONE ENCOUNTER
Patient calling reporting BP today- 166/77 and continued dry coughing after having been off lisinopril for 2 weeks.   Wondering how long this should be expected for?   Negative covid test 7/18. No s/s r/t HTN.    Patient wondering if she can start a different medication today and make an appointment with you for follow up once she returns from trip. Leaving 7/27 returning 8/7.    Please advise.     Please call patient with response at [phone].   OK to leave detailed message.

## 2022-07-21 NOTE — TELEPHONE ENCOUNTER
Call to patient to inform of medication prescribed and providers response.     Assisted in scheduling follow up appointment with Cyndi Stone MD on 8/29 re BP meds.

## 2022-07-22 ENCOUNTER — HOSPITAL ENCOUNTER (EMERGENCY)
Facility: HOSPITAL | Age: 62
Discharge: LEFT BEFORE DISCHARGE | End: 2022-07-22
Payer: COMMERCIAL

## 2022-07-22 ENCOUNTER — NURSE TRIAGE (OUTPATIENT)
Dept: NURSING | Facility: CLINIC | Age: 62
End: 2022-07-22

## 2022-07-22 VITALS
TEMPERATURE: 99.2 F | OXYGEN SATURATION: 96 % | RESPIRATION RATE: 16 BRPM | DIASTOLIC BLOOD PRESSURE: 90 MMHG | SYSTOLIC BLOOD PRESSURE: 160 MMHG | HEART RATE: 73 BPM

## 2022-07-22 NOTE — TELEPHONE ENCOUNTER
"Pt reports two days ago she changed from Lisinopril 10mg daily to Losartan 100mg daily. Today blood pressure was 195/80 and 188/77 five minutes later. \"Headaches off and on\". Pt reports very mild headache currently but denies any other acute symptoms. Pt had her dose of Losartan yesterday and today prior to above readings.     Nurse Triage SBAR    Is this a 2nd Level Triage? YES, LICENSED PRACTITIONER REVIEW IS REQUIRED    Situation:   High blood pressure    Background:   Recent med change from Lisinopril to Losartan 100mg daily     Assessment:   Possible med adjustment needed    Protocol Recommended Disposition:   See Today In Office    Recommendation:   Second level triage     Routed to provider    Does the patient meet one of the following criteria for ADS visit consideration? 16+ years old, with an [facility] PCP     TIP  Providers, please consider if this condition is appropriate for management at one of our Acute and Diagnostic Services sites.     If patient is a good candidate, please use dotphrase <dot>triageresponse and select Refer to ADS to document.      Reason for Disposition    Systolic BP >= 180 OR Diastolic >= 110    Additional Information    Negative: Sounds like a life-threatening emergency to the triager    Negative: Pregnant > 20 weeks or postpartum (< 6 weeks after delivery) and new hand or face swelling    Negative: Pregnant > 20 weeks and BP > 140/90    Negative: Systolic BP >= 160 OR Diastolic >= 100, and any cardiac or neurologic symptoms (e.g., chest pain, difficulty breathing, unsteady gait, blurred vision)    Negative: Patient sounds very sick or weak to the triager    Negative: BP Systolic BP >= 140 OR Diastolic >= 90 and postpartum (from 0 to 6 weeks after delivery)    Negative: Systolic BP >= 180 OR Diastolic >= 110, and missed most recent dose of blood pressure medication    Protocols used: HIGH BLOOD PRESSURE-A-OH      "

## 2022-07-22 NOTE — TELEPHONE ENCOUNTER
Recommend office visit as soon as possible to adjust medications in person.  Okay to wait until after weekend unless symptoms worsen in which case she should go to the emergency room or the ER.  Sounds like her headache is mild now but if it were to become worse or more persistent she should be seen.

## 2022-07-23 NOTE — ED TRIAGE NOTES
The pt arrives with c/o headache and blurred vision and elevated BP. Was started on lisinopril but developed a cough so she was started on a new med.      Triage Assessment     Row Name 07/22/22 1910       Triage Assessment (Adult)    Airway WDL WDL       Cognitive/Neuro/Behavioral WDL    Cognitive/Neuro/Behavioral WDL WDL

## 2022-08-07 ENCOUNTER — HEALTH MAINTENANCE LETTER (OUTPATIENT)
Age: 62
End: 2022-08-07

## 2022-08-12 DIAGNOSIS — I10 BENIGN ESSENTIAL HYPERTENSION: ICD-10-CM

## 2022-08-12 RX ORDER — LOSARTAN POTASSIUM 100 MG/1
TABLET ORAL
Qty: 30 TABLET | Refills: 1 | OUTPATIENT
Start: 2022-08-12

## 2022-09-15 ENCOUNTER — OFFICE VISIT (OUTPATIENT)
Dept: FAMILY MEDICINE | Facility: CLINIC | Age: 62
End: 2022-09-15
Payer: COMMERCIAL

## 2022-09-15 ENCOUNTER — MYC MEDICAL ADVICE (OUTPATIENT)
Dept: FAMILY MEDICINE | Facility: CLINIC | Age: 62
End: 2022-09-15

## 2022-09-15 VITALS — DIASTOLIC BLOOD PRESSURE: 76 MMHG | OXYGEN SATURATION: 94 % | SYSTOLIC BLOOD PRESSURE: 138 MMHG | HEART RATE: 62 BPM

## 2022-09-15 DIAGNOSIS — I10 BENIGN ESSENTIAL HYPERTENSION: Primary | ICD-10-CM

## 2022-09-15 DIAGNOSIS — F50.819 BINGE EATING DISORDER: ICD-10-CM

## 2022-09-15 DIAGNOSIS — E66.01 MORBID OBESITY (H): ICD-10-CM

## 2022-09-15 DIAGNOSIS — E11.9 TYPE 2 DIABETES MELLITUS WITHOUT COMPLICATION, WITHOUT LONG-TERM CURRENT USE OF INSULIN (H): Primary | ICD-10-CM

## 2022-09-15 DIAGNOSIS — R73.03 PREDIABETES: ICD-10-CM

## 2022-09-15 PROBLEM — R03.0 ELEVATED BLOOD PRESSURE READING WITHOUT DIAGNOSIS OF HYPERTENSION: Status: RESOLVED | Noted: 2022-02-21 | Resolved: 2022-09-15

## 2022-09-15 LAB
ANION GAP SERPL CALCULATED.3IONS-SCNC: 7 MMOL/L (ref 7–15)
BUN SERPL-MCNC: 17.4 MG/DL (ref 8–23)
CALCIUM SERPL-MCNC: 9 MG/DL (ref 8.8–10.2)
CHLORIDE SERPL-SCNC: 104 MMOL/L (ref 98–107)
CHOLEST SERPL-MCNC: 200 MG/DL
CREAT SERPL-MCNC: 0.71 MG/DL (ref 0.51–0.95)
DEPRECATED HCO3 PLAS-SCNC: 29 MMOL/L (ref 22–29)
GFR SERPL CREATININE-BSD FRML MDRD: >90 ML/MIN/1.73M2
GLUCOSE SERPL-MCNC: 129 MG/DL (ref 70–99)
HBA1C MFR BLD: 6.6 % (ref 0–5.6)
HDLC SERPL-MCNC: 53 MG/DL
LDLC SERPL CALC-MCNC: 130 MG/DL
NONHDLC SERPL-MCNC: 147 MG/DL
POTASSIUM SERPL-SCNC: 4.3 MMOL/L (ref 3.4–5.3)
SODIUM SERPL-SCNC: 140 MMOL/L (ref 136–145)
TRIGL SERPL-MCNC: 86 MG/DL

## 2022-09-15 PROCEDURE — 80048 BASIC METABOLIC PNL TOTAL CA: CPT | Performed by: FAMILY MEDICINE

## 2022-09-15 PROCEDURE — 80061 LIPID PANEL: CPT | Performed by: FAMILY MEDICINE

## 2022-09-15 PROCEDURE — 83036 HEMOGLOBIN GLYCOSYLATED A1C: CPT | Performed by: FAMILY MEDICINE

## 2022-09-15 PROCEDURE — 36415 COLL VENOUS BLD VENIPUNCTURE: CPT | Performed by: FAMILY MEDICINE

## 2022-09-15 PROCEDURE — 90682 RIV4 VACC RECOMBINANT DNA IM: CPT | Performed by: FAMILY MEDICINE

## 2022-09-15 PROCEDURE — 90471 IMMUNIZATION ADMIN: CPT | Performed by: FAMILY MEDICINE

## 2022-09-15 PROCEDURE — 99214 OFFICE O/P EST MOD 30 MIN: CPT | Mod: 25 | Performed by: FAMILY MEDICINE

## 2022-09-15 RX ORDER — LISDEXAMFETAMINE DIMESYLATE 30 MG/1
30 CAPSULE ORAL EVERY MORNING
Qty: 30 CAPSULE | Refills: 0 | Status: SHIPPED | OUTPATIENT
Start: 2022-09-15 | End: 2023-05-18

## 2022-09-15 RX ORDER — LOSARTAN POTASSIUM 100 MG/1
100 TABLET ORAL DAILY
Qty: 90 TABLET | Refills: 1 | Status: SHIPPED | OUTPATIENT
Start: 2022-09-15 | End: 2023-05-18

## 2022-09-15 ASSESSMENT — PATIENT HEALTH QUESTIONNAIRE - PHQ9
SUM OF ALL RESPONSES TO PHQ QUESTIONS 1-9: 2
10. IF YOU CHECKED OFF ANY PROBLEMS, HOW DIFFICULT HAVE THESE PROBLEMS MADE IT FOR YOU TO DO YOUR WORK, TAKE CARE OF THINGS AT HOME, OR GET ALONG WITH OTHER PEOPLE: NOT DIFFICULT AT ALL
SUM OF ALL RESPONSES TO PHQ QUESTIONS 1-9: 2

## 2022-09-15 NOTE — ASSESSMENT & PLAN NOTE
The patient has been through the [facility] program in the past and currently is doing some self-help work on binge eating.  The patient does feel she has binging episodes a couple of times per week.  We discussed Vyvanse as a treatment option and a prescription was provided today.  I asked that she follow-up in 3 to 4 weeks regarding her response to that treatment as well as for a blood pressure check.  In addition, we talked today about the possibility of adding a GLP-1 agonist especially if her binge eating symptoms get under better control.

## 2022-09-15 NOTE — PROGRESS NOTES
Problem List Items Addressed This Visit        Digestive    Morbid obesity (H)     The patient has been through the [facility] program in the past and currently is doing some self-help work on binge eating.  The patient does feel she has binging episodes a couple of times per week.  We discussed Vyvanse as a treatment option and a prescription was provided today.  I asked that she follow-up in 3 to 4 weeks regarding her response to that treatment as well as for a blood pressure check.  In addition, we talked today about the possibility of adding a GLP-1 agonist especially if her binge eating symptoms get under better control.           Relevant Medications    lisdexamfetamine (VYVANSE) 30 MG capsule       Endocrine    Prediabetes    Relevant Orders    Hemoglobin A1c       Circulatory    Benign essential hypertension - Primary    Relevant Medications    losartan (COZAAR) 100 MG tablet    Other Relevant Orders    Basic metabolic panel  (Ca, Cl, CO2, Creat, Gluc, K, Na, BUN)    Lipid Profile (Chol, Trig, HDL, LDL calc)       Behavioral    Binge eating disorder    Relevant Medications    lisdexamfetamine (VYVANSE) 30 MG capsule             Ben Aggarwal is a 62 year old who presents today primarily for a follow-up regarding hypertension.  The patient was started on blood pressure medication in the form of losartan 100 mg daily.  She is tolerating the medication well without any side effects.  Her blood pressure appears to be under much better control.  In addition, the patient mentions that she is currently working on her own through the workbook and program regarding binge eating symptoms.  The patient states that she went to the [facility] program several years ago which was quite helpful but that she continues to struggle with excessive appetite and at times binge eating tendencies.  She feels she has these issues at least once or twice each week.  The patient is very physically active in fact just rode her bicycle  30 miles yesterday.  However, she is motivated and interested in losing some weight.  The patient also mentions that she is worried that a Q-tip tip may have broken off into her right ear and would like me to take a look at that today.  Lastly, she would like me to take a listen to her lungs as she had COVID in August and still has a residual cough.  She does not have any fever, chest pains or difficulty breathing.  The cough is becoming slightly productive.  She denies any sinus symptoms associated with this.      Chief Complaint   Patient presents with     Hypertension      History of Present Illness       Hypertension: She presents for follow up of hypertension.  She does not check blood pressure  regularly outside of the clinic. Outside blood pressures have been over 140/90. She does not follow a low salt diet.      Today's PHQ-9         PHQ-9 Total Score: 2    PHQ-9 Q9 Thoughts of better off dead/self-harm past 2 weeks :   Not at all    How difficult have these problems made it for you to do your work, take care of things at home, or get along with other people: Not difficult at all             Objective    /76 (BP Location: Left arm, Patient Position: Left side, Cuff Size: Adult Large)   Pulse 62   SpO2 94%   There is no height or weight on file to calculate BMI.  Physical Exam   GENERAL: healthy, alert and no distress  HENT: right ear: cerumen occluded and left ear: normal: no effusions, no erythema, normal landmarks  RESP: lungs clear to auscultation - no rales, rhonchi or wheezes  CV: regular rate and rhythm, normal S1 S2, no S3 or S4, no murmur, click or rub, no peripheral edema and peripheral pulses strong      This note has been dictated using voice recognition software. Any grammatical or context distortions are unintentional and inherent to the software

## 2022-09-16 RX ORDER — SEMAGLUTIDE 1.34 MG/ML
INJECTION, SOLUTION SUBCUTANEOUS
Qty: 0.75 ML | Refills: 0 | Status: SHIPPED | OUTPATIENT
Start: 2022-09-16 | End: 2022-10-14

## 2022-10-11 ENCOUNTER — MYC MEDICAL ADVICE (OUTPATIENT)
Dept: FAMILY MEDICINE | Facility: CLINIC | Age: 62
End: 2022-10-11

## 2022-10-11 DIAGNOSIS — R73.03 PREDIABETES: ICD-10-CM

## 2022-10-11 DIAGNOSIS — E66.01 MORBID OBESITY (H): Primary | ICD-10-CM

## 2022-10-12 RX ORDER — SEMAGLUTIDE 1.34 MG/ML
0.5 INJECTION, SOLUTION SUBCUTANEOUS
Qty: 1.5 ML | Refills: 0 | Status: SHIPPED | OUTPATIENT
Start: 2022-10-12 | End: 2022-12-09

## 2022-11-08 ENCOUNTER — MYC MEDICAL ADVICE (OUTPATIENT)
Dept: FAMILY MEDICINE | Facility: CLINIC | Age: 62
End: 2022-11-08

## 2022-11-08 DIAGNOSIS — E66.01 MORBID OBESITY (H): Primary | ICD-10-CM

## 2022-11-08 RX ORDER — SEMAGLUTIDE 1.34 MG/ML
1 INJECTION, SOLUTION SUBCUTANEOUS WEEKLY
Qty: 3 ML | Refills: 0 | Status: SHIPPED | OUTPATIENT
Start: 2022-11-08 | End: 2022-12-09

## 2022-12-09 ENCOUNTER — MYC MEDICAL ADVICE (OUTPATIENT)
Dept: FAMILY MEDICINE | Facility: CLINIC | Age: 62
End: 2022-12-09

## 2022-12-09 DIAGNOSIS — E66.01 MORBID OBESITY (H): Primary | ICD-10-CM

## 2023-04-02 ENCOUNTER — HEALTH MAINTENANCE LETTER (OUTPATIENT)
Age: 63
End: 2023-04-02

## 2023-05-18 ENCOUNTER — OFFICE VISIT (OUTPATIENT)
Dept: FAMILY MEDICINE | Facility: CLINIC | Age: 63
End: 2023-05-18
Payer: COMMERCIAL

## 2023-05-18 VITALS
WEIGHT: 216 LBS | HEIGHT: 66 IN | BODY MASS INDEX: 34.72 KG/M2 | HEART RATE: 66 BPM | SYSTOLIC BLOOD PRESSURE: 113 MMHG | OXYGEN SATURATION: 95 % | DIASTOLIC BLOOD PRESSURE: 70 MMHG

## 2023-05-18 DIAGNOSIS — I10 BENIGN ESSENTIAL HYPERTENSION: ICD-10-CM

## 2023-05-18 DIAGNOSIS — E66.01 MORBID OBESITY (H): ICD-10-CM

## 2023-05-18 DIAGNOSIS — E11.9 TYPE 2 DIABETES MELLITUS WITHOUT COMPLICATION, WITHOUT LONG-TERM CURRENT USE OF INSULIN (H): Primary | ICD-10-CM

## 2023-05-18 LAB
CHOLEST SERPL-MCNC: 205 MG/DL
HBA1C MFR BLD: 5.4 % (ref 0–5.6)
HDLC SERPL-MCNC: 53 MG/DL
HOLD SPECIMEN: NORMAL
HOLD SPECIMEN: NORMAL
LDLC SERPL CALC-MCNC: 141 MG/DL
NONHDLC SERPL-MCNC: 152 MG/DL
TRIGL SERPL-MCNC: 55 MG/DL

## 2023-05-18 PROCEDURE — 36415 COLL VENOUS BLD VENIPUNCTURE: CPT | Performed by: FAMILY MEDICINE

## 2023-05-18 PROCEDURE — 90471 IMMUNIZATION ADMIN: CPT | Performed by: FAMILY MEDICINE

## 2023-05-18 PROCEDURE — 90677 PCV20 VACCINE IM: CPT | Performed by: FAMILY MEDICINE

## 2023-05-18 PROCEDURE — 80061 LIPID PANEL: CPT | Performed by: FAMILY MEDICINE

## 2023-05-18 PROCEDURE — 83036 HEMOGLOBIN GLYCOSYLATED A1C: CPT | Performed by: FAMILY MEDICINE

## 2023-05-18 PROCEDURE — 99214 OFFICE O/P EST MOD 30 MIN: CPT | Mod: 25 | Performed by: FAMILY MEDICINE

## 2023-05-18 RX ORDER — ATORVASTATIN CALCIUM 10 MG/1
10 TABLET, FILM COATED ORAL DAILY
Qty: 90 TABLET | Refills: 3 | Status: SHIPPED | OUTPATIENT
Start: 2023-05-18 | End: 2024-05-22

## 2023-05-18 RX ORDER — LOSARTAN POTASSIUM 50 MG/1
50 TABLET ORAL DAILY
Qty: 90 TABLET | Refills: 1 | Status: SHIPPED | OUTPATIENT
Start: 2023-05-18 | End: 2024-05-22

## 2023-05-18 ASSESSMENT — PATIENT HEALTH QUESTIONNAIRE - PHQ9
10. IF YOU CHECKED OFF ANY PROBLEMS, HOW DIFFICULT HAVE THESE PROBLEMS MADE IT FOR YOU TO DO YOUR WORK, TAKE CARE OF THINGS AT HOME, OR GET ALONG WITH OTHER PEOPLE: NOT DIFFICULT AT ALL
SUM OF ALL RESPONSES TO PHQ QUESTIONS 1-9: 0
SUM OF ALL RESPONSES TO PHQ QUESTIONS 1-9: 0

## 2023-05-18 NOTE — ASSESSMENT & PLAN NOTE
The patient's diabetes is under excellent control on Ozempic and with her associated weight loss.  Her A1c is now down to 5.6.  Continue Ozempic 2 mg weekly and I counseled the patient regarding adding a atorvastatin 10 mg daily to her regimen in order to reduce risk of cardiovascular events.  Follow-up in 1 month for a lipid and ALT.

## 2023-05-18 NOTE — ASSESSMENT & PLAN NOTE
I reduce the patient's dose of losartan to 50 mg daily.  I asked her to monitor her blood pressure more frequently at home and we discussed the possibility that if her parameters remain under excellent control with the reduction, we could possibly stop the medication altogether and would reevaluate in a month.

## 2023-05-18 NOTE — ASSESSMENT & PLAN NOTE
The patient is down about 23% of her original weight with the support of Ozempic as well as a healthy approach to eating and increased exercise.  She was congratulated on her weight loss and is feeling very good.

## 2023-05-18 NOTE — PROGRESS NOTES
"  Assessment & Plan   Problem List Items Addressed This Visit        Digestive    Morbid obesity (H)     The patient is down about 23% of her original weight with the support of Ozempic as well as a healthy approach to eating and increased exercise.  She was congratulated on her weight loss and is feeling very good.            Endocrine    Type 2 diabetes mellitus without complication, without long-term current use of insulin (H) - Primary     The patient's diabetes is under excellent control on Ozempic and with her associated weight loss.  Her A1c is now down to 5.6.  Continue Ozempic 2 mg weekly and I counseled the patient regarding adding a atorvastatin 10 mg daily to her regimen in order to reduce risk of cardiovascular events.  Follow-up in 1 month for a lipid and ALT.         Relevant Medications    atorvastatin (LIPITOR) 10 MG tablet    Other Relevant Orders    Hemoglobin A1c (Completed)    Lipid Profile (Chol, Trig, HDL, LDL calc)    Lipid Profile (Chol, Trig, HDL, LDL calc)    ALT       Circulatory    Benign essential hypertension     I reduce the patient's dose of losartan to 50 mg daily.  I asked her to monitor her blood pressure more frequently at home and we discussed the possibility that if her parameters remain under excellent control with the reduction, we could possibly stop the medication altogether and would reevaluate in a month.         Relevant Medications    losartan (COZAAR) 50 MG tablet           BMI:   Estimated body mass index is 34.86 kg/m  as calculated from the following:    Height as of this encounter: 1.676 m (5' 6\").    Weight as of this encounter: 98 kg (216 lb).       THONG MONIQUE MD  [facility] is a 63 year old who presents today for follow-up as it relates to the treatment of type 2 diabetes as well as weight management.  The patient has a history of binge eating disorder as well as type 2 diabetes and was prescribed Ozempic " "this past fall.  She states that this medication has really changed her life.  She no longer feels the drive to binge and has not had any binge eating issues since that time.  She finds that it curbs her appetite just enough to stay on track with a healthy approach to eating which she has been very motivated to do.  She says she still needs to be conscientious and to some extent discipline but has been much better at not bringing unhealthy foods into the home.  She has been much more physically active because of the weight loss and is enjoying feeling a lot more energy and overall significant improvement in quality of life.      No chief complaint on file.         View : No data to display.              History of Present Illness       Diabetes:   She presents for follow up of diabetes.  She is not checking blood glucose. She has no concerns regarding her diabetes at this time.  She is not experiencing numbness or burning in feet, excessive thirst, blurry vision, weight changes or redness, sores or blisters on feet. The patient has had a diabetic eye exam in the last 12 months. Eye exam performed on 2022. Location of last eye exam associated eye care.        She eats 4 or more servings of fruits and vegetables daily.She consumes 0 sweetened beverage(s) daily.She exercises with enough effort to increase her heart rate 30 to 60 minutes per day.  She exercises with enough effort to increase her heart rate 5 days per week.   She is taking medications regularly.    Today's PHQ-9         PHQ-9 Total Score: 0    PHQ-9 Q9 Thoughts of better off dead/self-harm past 2 weeks :   Not at all    How difficult have these problems made it for you to do your work, take care of things at home, or get along with other people: Not difficult at all           Objective    /70 (BP Location: Left arm, Patient Position: Left side, Cuff Size: Adult Large)   Pulse 66   Ht 1.676 m (5' 6\")   Wt 98 kg (216 lb)   SpO2 95%   BMI 34.86 " kg/m    Body mass index is 34.86 kg/m .  Physical Exam   GENERAL: healthy, alert and no distress  Diabetic foot exam: normal DP and PT pulses, no trophic changes or ulcerative lesions and normal sensory exam

## 2023-07-10 ENCOUNTER — TRANSFERRED RECORDS (OUTPATIENT)
Dept: HEALTH INFORMATION MANAGEMENT | Facility: CLINIC | Age: 63
End: 2023-07-10
Payer: COMMERCIAL

## 2023-07-18 ENCOUNTER — TELEPHONE (OUTPATIENT)
Dept: FAMILY MEDICINE | Facility: CLINIC | Age: 63
End: 2023-07-18

## 2023-07-18 NOTE — TELEPHONE ENCOUNTER
Patient Quality Outreach    Patient is due for the following:   Diabetes -  LDL (Fasting)    Next Steps:   Schedule a lab only visit for lipids and lft per zi    Type of outreach:    Sent [employer] message.      Questions for provider review:    None           Britni Lou  Chart routed to Care Team.

## 2023-07-25 ENCOUNTER — DOCUMENTATION ONLY (OUTPATIENT)
Dept: LAB | Facility: CLINIC | Age: 63
End: 2023-07-25

## 2023-07-25 ENCOUNTER — LAB (OUTPATIENT)
Dept: LAB | Facility: CLINIC | Age: 63
End: 2023-07-25
Payer: COMMERCIAL

## 2023-07-25 ENCOUNTER — ALLIED HEALTH/NURSE VISIT (OUTPATIENT)
Dept: FAMILY MEDICINE | Facility: CLINIC | Age: 63
End: 2023-07-25
Payer: COMMERCIAL

## 2023-07-25 VITALS — SYSTOLIC BLOOD PRESSURE: 138 MMHG | DIASTOLIC BLOOD PRESSURE: 82 MMHG

## 2023-07-25 DIAGNOSIS — R03.0 ELEVATED BLOOD PRESSURE READING WITHOUT DIAGNOSIS OF HYPERTENSION: Primary | ICD-10-CM

## 2023-07-25 DIAGNOSIS — Z11.59 NEED FOR HEPATITIS C SCREENING TEST: ICD-10-CM

## 2023-07-25 DIAGNOSIS — E11.9 TYPE 2 DIABETES MELLITUS WITHOUT COMPLICATION, WITHOUT LONG-TERM CURRENT USE OF INSULIN (H): Primary | ICD-10-CM

## 2023-07-25 DIAGNOSIS — Z11.4 SCREENING FOR HIV (HUMAN IMMUNODEFICIENCY VIRUS): ICD-10-CM

## 2023-07-25 DIAGNOSIS — E11.9 TYPE 2 DIABETES MELLITUS WITHOUT COMPLICATION, WITHOUT LONG-TERM CURRENT USE OF INSULIN (H): ICD-10-CM

## 2023-07-25 LAB
ALT SERPL W P-5'-P-CCNC: 13 U/L (ref 0–50)
CHOLEST SERPL-MCNC: 200 MG/DL
CREAT UR-MCNC: 300 MG/DL
HDLC SERPL-MCNC: 55 MG/DL
LDLC SERPL CALC-MCNC: 128 MG/DL
MICROALBUMIN UR-MCNC: 22.9 MG/L
MICROALBUMIN/CREAT UR: 7.63 MG/G CR (ref 0–25)
NONHDLC SERPL-MCNC: 145 MG/DL
TRIGL SERPL-MCNC: 83 MG/DL

## 2023-07-25 PROCEDURE — 36415 COLL VENOUS BLD VENIPUNCTURE: CPT

## 2023-07-25 PROCEDURE — 86803 HEPATITIS C AB TEST: CPT

## 2023-07-25 PROCEDURE — 80061 LIPID PANEL: CPT

## 2023-07-25 PROCEDURE — 84460 ALANINE AMINO (ALT) (SGPT): CPT

## 2023-07-25 PROCEDURE — 87389 HIV-1 AG W/HIV-1&-2 AB AG IA: CPT

## 2023-07-25 PROCEDURE — 82570 ASSAY OF URINE CREATININE: CPT

## 2023-07-25 PROCEDURE — 99207 PR NO CHARGE NURSE ONLY: CPT

## 2023-07-25 PROCEDURE — 82043 UR ALBUMIN QUANTITATIVE: CPT

## 2023-07-25 RX ORDER — ROSUVASTATIN CALCIUM 5 MG/1
5 TABLET, COATED ORAL DAILY
Qty: 30 TABLET | Refills: 1 | Status: SHIPPED | OUTPATIENT
Start: 2023-07-25 | End: 2023-08-20

## 2023-07-25 NOTE — PROGRESS NOTES
Patient notified of provider's message as written via vm. Patient verbalized understanding.    Encouraged patient to call the clinic with further questions/concerns.     Sina Pierre RN  [facility]

## 2023-07-25 NOTE — PROGRESS NOTES
Please let patient know that her blood pressure looks reasonable and we will continue to keep an eye on it but no changes needed at this time.  I sent in a prescription for the lowest dose of rosuvastatin, Crestor, and would advise giving that a try.  Hopefully she will be able to tolerate that better than the other statin.

## 2023-07-25 NOTE — PROGRESS NOTES
I met with Alessia A Comfort at the request of Dr Stone to recheck her blood pressure.  Blood pressure medications on the med list were reviewed with patient.    Patient has taken all medications as per usual regimen: Yes  Patient reports tolerating them without any issues or concerns: Yes    Vitals:    07/25/23 0944   BP: 138/82   BP Location: Left arm   Patient Position: Sitting   Cuff Size: Adult Large       Blood pressure was taken, previous encounter was reviewed, recorded blood pressure below 140/90.  Patient was discharged and the note will be sent to the provider for final review.     Alessia is also wanting Dr Stone to know that she was unable to remain on her Atorvastatin due to achy in every joint side effect.  If recommended to remain on a statin would like ot try Rosuvastin, as her sister has been on this medication.

## 2023-07-26 LAB
HCV AB SERPL QL IA: NONREACTIVE
HIV 1+2 AB+HIV1 P24 AG SERPL QL IA: NONREACTIVE

## 2023-08-18 DIAGNOSIS — E11.9 TYPE 2 DIABETES MELLITUS WITHOUT COMPLICATION, WITHOUT LONG-TERM CURRENT USE OF INSULIN (H): ICD-10-CM

## 2023-08-18 NOTE — TELEPHONE ENCOUNTER
"Routing refill request to provider for review/approval because:  Early refill request    Last Written Prescription Date:  7/25/23  Last Fill Quantity: 30,  # refills: 1   Last office visit provider:   5/18/23 with zi    Requested Prescriptions   Pending Prescriptions Disp Refills    rosuvastatin (CRESTOR) 5 MG tablet [Pharmacy Med Name: ROSUVASTATIN CALCIUM 5 MG TAB] 30 tablet 1     Sig: TAKE 1 TABLET BY MOUTH EVERY DAY       Statins Protocol Passed - 8/18/2023 12:10 PM        Passed - LDL on file in past 12 months     Recent Labs   Lab Test 07/25/23  0940   *             Passed - No abnormal creatine kinase in past 12 months     No lab results found.             Passed - Recent (12 mo) or future (30 days) visit within the authorizing provider's specialty     Patient has had an office visit with the authorizing provider or a provider within the authorizing providers department within the previous 12 mos or has a future within next 30 days. See \"Patient Info\" tab in inbasket, or \"Choose Columns\" in Meds & Orders section of the refill encounter.              Passed - Medication is active on med list        Passed - Patient is age 18 or older        Passed - No active pregnancy on record        Passed - No positive pregnancy test in past 12 months             SILVER YEE RN 08/18/23 12:10 PM  "

## 2023-08-20 RX ORDER — ROSUVASTATIN CALCIUM 5 MG/1
5 TABLET, COATED ORAL DAILY
Qty: 90 TABLET | Refills: 3 | Status: SHIPPED | OUTPATIENT
Start: 2023-08-20 | End: 2024-08-12

## 2023-08-30 ENCOUNTER — TELEPHONE (OUTPATIENT)
Dept: FAMILY MEDICINE | Facility: CLINIC | Age: 63
End: 2023-08-30
Payer: COMMERCIAL

## 2023-08-30 NOTE — TELEPHONE ENCOUNTER
Prior Authorization Request  Who s requesting:  Patient  Pharmacy Name and Location: [facility]  Medication Name: OZEMPIC, 2 MG/DOSE, 8 MG/3ML pen   Insurance Plan: [email or web address]  Insurance Member ID Number:  [id number]   CoverMyMeds Key:   Informed patient that prior authorizations can take up to 10 business days for response:   Yes  Okay to leave a detailed message: Yes     Route to pool:  KARLY MCNEAL MED

## 2023-09-01 NOTE — TELEPHONE ENCOUNTER
Central Prior Authorization Team   Phone: [phone]    PA Initiation    Medication: Ozempic  Insurance Company: [employer] Non-Specialty PA's - Phone [phone] Fax [phone]  Pharmacy Filling the Rx: [id number] - [place] [id number]  Filling Pharmacy Phone: [phone]  Filling Pharmacy Fax:    Start Date: 9/1/2023

## 2023-09-02 ENCOUNTER — HEALTH MAINTENANCE LETTER (OUTPATIENT)
Age: 63
End: 2023-09-02

## 2023-09-07 NOTE — TELEPHONE ENCOUNTER
Prior Authorization Not Needed per Insurance    Medication: Ozempic  Insurance Company: [employer] Non-Specialty PA's - Phone [phone] Fax [phone]  Expected CoPay:      Pharmacy Filling the Rx: [id number] - [place] [id number]  Pharmacy Notified: Yes  Patient Notified: No

## 2023-09-20 ENCOUNTER — MYC MEDICAL ADVICE (OUTPATIENT)
Dept: FAMILY MEDICINE | Facility: CLINIC | Age: 63
End: 2023-09-20
Payer: COMMERCIAL

## 2023-09-20 DIAGNOSIS — E11.9 TYPE 2 DIABETES MELLITUS WITHOUT COMPLICATION, WITHOUT LONG-TERM CURRENT USE OF INSULIN (H): Primary | ICD-10-CM

## 2023-10-17 ENCOUNTER — TELEPHONE (OUTPATIENT)
Dept: FAMILY MEDICINE | Facility: CLINIC | Age: 63
End: 2023-10-17

## 2023-10-17 NOTE — TELEPHONE ENCOUNTER
Prior Authorization Request  Who s requesting:  Patient  Pharmacy Name and Location: [facility] [address] [place] [address] [phone]   Medication Name:     OZEMPIC, 2 MG/DOSE, 8 MG/3ML pen INJECT 2 MG SUBCUTANEOUS ONCE A WEEK     Insurance Plan: [employer]   Insurance Member ID Number:  [id number]   CoverMyMeds Key: n/a  Informed patient that prior authorizations can take up to 10 business days for response:   Yes  Okay to leave a detailed message: Yes     Route to pool:  P REBECCA MCNEAL MED

## 2023-10-17 NOTE — TELEPHONE ENCOUNTER
Prior Authorization Approval    Medication: OZEMPIC (2 MG/DOSE) 8 MG/3ML SC SOPN  Authorization Effective Date: 10/17/2023  Authorization Expiration Date: 10/17/2026  Approved Dose/Quantity:   Reference #:     Insurance Company: [facility] [employer] - Phone [phone] Fax [phone]  Expected CoPay: $    CoPay Card Available:      Financial Assistance Needed:   Which Pharmacy is filling the prescription: [facility] [address] [place] [address]  Pharmacy Notified: Yes  Patient Notified: Left a message for patient.

## 2023-10-17 NOTE — TELEPHONE ENCOUNTER
Central Prior Authorization Team  Phone: [phone]    PA Initiation    Medication: OZEMPIC (2 MG/DOSE) 8 MG/3ML SC SOPN  Insurance Company: [facility] [employer] - Phone [phone] Fax [phone]  Pharmacy Filling the Rx: [id number] - [place] [id number]  Filling Pharmacy Phone: [phone]  Filling Pharmacy Fax:    Start Date: 10/17/2023

## 2023-10-31 ENCOUNTER — LAB (OUTPATIENT)
Dept: LAB | Facility: CLINIC | Age: 63
End: 2023-10-31
Payer: COMMERCIAL

## 2023-10-31 ENCOUNTER — ALLIED HEALTH/NURSE VISIT (OUTPATIENT)
Dept: FAMILY MEDICINE | Facility: CLINIC | Age: 63
End: 2023-10-31
Payer: COMMERCIAL

## 2023-10-31 VITALS — SYSTOLIC BLOOD PRESSURE: 132 MMHG | DIASTOLIC BLOOD PRESSURE: 82 MMHG

## 2023-10-31 DIAGNOSIS — I10 BENIGN ESSENTIAL HYPERTENSION: ICD-10-CM

## 2023-10-31 DIAGNOSIS — E11.9 TYPE 2 DIABETES MELLITUS WITHOUT COMPLICATION, WITHOUT LONG-TERM CURRENT USE OF INSULIN (H): Primary | ICD-10-CM

## 2023-10-31 DIAGNOSIS — I10 BENIGN ESSENTIAL HYPERTENSION: Primary | ICD-10-CM

## 2023-10-31 LAB
ALT SERPL W P-5'-P-CCNC: 12 U/L (ref 0–50)
ANION GAP SERPL CALCULATED.3IONS-SCNC: 9 MMOL/L (ref 7–15)
BUN SERPL-MCNC: 17.6 MG/DL (ref 8–23)
CALCIUM SERPL-MCNC: 9.1 MG/DL (ref 8.8–10.2)
CHLORIDE SERPL-SCNC: 107 MMOL/L (ref 98–107)
CHOLEST SERPL-MCNC: 142 MG/DL
CREAT SERPL-MCNC: 0.77 MG/DL (ref 0.51–0.95)
DEPRECATED HCO3 PLAS-SCNC: 28 MMOL/L (ref 22–29)
EGFRCR SERPLBLD CKD-EPI 2021: 87 ML/MIN/1.73M2
GLUCOSE SERPL-MCNC: 103 MG/DL (ref 70–99)
HBA1C MFR BLD: 5.3 % (ref 0–5.6)
HDLC SERPL-MCNC: 69 MG/DL
LDLC SERPL CALC-MCNC: 65 MG/DL
NONHDLC SERPL-MCNC: 73 MG/DL
POTASSIUM SERPL-SCNC: 4.4 MMOL/L (ref 3.4–5.3)
SODIUM SERPL-SCNC: 144 MMOL/L (ref 135–145)
TRIGL SERPL-MCNC: 42 MG/DL

## 2023-10-31 PROCEDURE — 99207 PR NO CHARGE NURSE ONLY: CPT

## 2023-10-31 PROCEDURE — 84460 ALANINE AMINO (ALT) (SGPT): CPT

## 2023-10-31 PROCEDURE — 80061 LIPID PANEL: CPT

## 2023-10-31 PROCEDURE — 83036 HEMOGLOBIN GLYCOSYLATED A1C: CPT

## 2023-10-31 PROCEDURE — 36415 COLL VENOUS BLD VENIPUNCTURE: CPT

## 2023-10-31 PROCEDURE — 80048 BASIC METABOLIC PNL TOTAL CA: CPT

## 2023-10-31 ASSESSMENT — PAIN SCALES - GENERAL: PAINLEVEL: NO PAIN (0)

## 2023-10-31 NOTE — PROGRESS NOTES
Okay to stay off blood pressure medication for now.  However, I would prefer if her systolic was below 130.  Does she check her blood pressure at home ever?  We can reevaluate at her next appointment.

## 2023-10-31 NOTE — PROGRESS NOTES
Left message to call back for: pt  Information to relay to patient: see providers comments below and relay/inquire.

## 2023-10-31 NOTE — PROGRESS NOTES
Call to patient to inform she could bring home cuff in for comparison to clinic measurements. Patient states she just needs to get a new cuff and will continue to monitor at home. If SBP consistently >130, she will call clinic. She plans to call and schedule a follow-up diabetic check before end of year.

## 2023-10-31 NOTE — PROGRESS NOTES
I met with Alessia GENOVEVA Thakur at the request of Dr Stone to recheck her blood pressure.  Blood pressure medications on the med list were reviewed with patient.    Patient has taken all medications as per usual regimen: NA  Patient reports tolerating them without any issues or concerns: NA    Vitals:    10/31/23 0944   BP: 132/82   BP Location: Left arm   Patient Position: Sitting   Cuff Size: Adult Regular       Blood pressure was taken, previous encounter was reviewed, recorded blood pressure below 140/90.  Patient was discharged and the note will be sent to the provider for final review.    Alessia states that she has lost 90 pounds and is now off BP medications.

## 2023-10-31 NOTE — PROGRESS NOTES
Patient returned call. Message relayed. Patient has BP monitor at home but has had issues with accuracy.

## 2023-11-01 RX ORDER — LOSARTAN POTASSIUM 50 MG/1
50 TABLET ORAL DAILY
Qty: 90 TABLET | Refills: 1 | OUTPATIENT
Start: 2023-11-01

## 2024-01-31 ENCOUNTER — DOCUMENTATION ONLY (OUTPATIENT)
Dept: SLEEP MEDICINE | Facility: CLINIC | Age: 64
End: 2024-01-31
Payer: COMMERCIAL

## 2024-01-31 DIAGNOSIS — G47.33 OBSTRUCTIVE SLEEP APNEA: Primary | ICD-10-CM

## 2024-01-31 NOTE — PROGRESS NOTES
Patient called back and stated she can't stand the high CPAP pressures she requested a change in CPAP pressure.  Will put in for a pressure change.

## 2024-01-31 NOTE — Clinical Note
Request pressure change 8-12 cm H20 in [facility]  patient having difficulty with the high CPAP pressures.

## 2024-01-31 NOTE — PROGRESS NOTES
STM Recheck: Pressure change complete in [facility] 8-12 cm H20. Called patient left message to inform her.

## 2024-03-20 ENCOUNTER — TRANSFERRED RECORDS (OUTPATIENT)
Dept: HEALTH INFORMATION MANAGEMENT | Facility: CLINIC | Age: 64
End: 2024-03-20

## 2024-03-20 LAB — RETINOPATHY: NEGATIVE

## 2024-03-30 ENCOUNTER — HEALTH MAINTENANCE LETTER (OUTPATIENT)
Age: 64
End: 2024-03-30

## 2024-05-22 ENCOUNTER — OFFICE VISIT (OUTPATIENT)
Dept: FAMILY MEDICINE | Facility: CLINIC | Age: 64
End: 2024-05-22
Payer: COMMERCIAL

## 2024-05-22 VITALS
BODY MASS INDEX: 33.54 KG/M2 | HEIGHT: 66 IN | HEART RATE: 71 BPM | WEIGHT: 208.7 LBS | TEMPERATURE: 98.5 F | SYSTOLIC BLOOD PRESSURE: 137 MMHG | RESPIRATION RATE: 18 BRPM | OXYGEN SATURATION: 98 % | DIASTOLIC BLOOD PRESSURE: 69 MMHG

## 2024-05-22 DIAGNOSIS — E66.811 CLASS 1 OBESITY DUE TO EXCESS CALORIES WITH SERIOUS COMORBIDITY AND BODY MASS INDEX (BMI) OF 33.0 TO 33.9 IN ADULT: ICD-10-CM

## 2024-05-22 DIAGNOSIS — Z00.00 ROUTINE GENERAL MEDICAL EXAMINATION AT A HEALTH CARE FACILITY: Primary | ICD-10-CM

## 2024-05-22 DIAGNOSIS — G89.29 CHRONIC MIDLINE LOW BACK PAIN WITHOUT SCIATICA: ICD-10-CM

## 2024-05-22 DIAGNOSIS — E66.09 CLASS 1 OBESITY DUE TO EXCESS CALORIES WITH SERIOUS COMORBIDITY AND BODY MASS INDEX (BMI) OF 33.0 TO 33.9 IN ADULT: ICD-10-CM

## 2024-05-22 DIAGNOSIS — F32.5 MAJOR DEPRESSIVE DISORDER WITH SINGLE EPISODE, IN FULL REMISSION (H): ICD-10-CM

## 2024-05-22 DIAGNOSIS — Z12.11 SCREEN FOR COLON CANCER: ICD-10-CM

## 2024-05-22 DIAGNOSIS — M54.50 CHRONIC MIDLINE LOW BACK PAIN WITHOUT SCIATICA: ICD-10-CM

## 2024-05-22 DIAGNOSIS — E11.9 TYPE 2 DIABETES MELLITUS WITHOUT COMPLICATION, WITHOUT LONG-TERM CURRENT USE OF INSULIN (H): ICD-10-CM

## 2024-05-22 PROBLEM — I10 BENIGN ESSENTIAL HYPERTENSION: Status: RESOLVED | Noted: 2022-09-15 | Resolved: 2024-05-22

## 2024-05-22 PROBLEM — Z86.59 HISTORY OF EATING DISORDER: Status: ACTIVE | Noted: 2022-09-15

## 2024-05-22 LAB
HBA1C MFR BLD: 5.1 % (ref 0–5.6)
HOLD SPECIMEN: NORMAL
HOLD SPECIMEN: NORMAL

## 2024-05-22 PROCEDURE — 91320 SARSCV2 VAC 30MCG TRS-SUC IM: CPT | Performed by: FAMILY MEDICINE

## 2024-05-22 PROCEDURE — 99213 OFFICE O/P EST LOW 20 MIN: CPT | Mod: 25 | Performed by: FAMILY MEDICINE

## 2024-05-22 PROCEDURE — 90480 ADMN SARSCOV2 VAC 1/ONLY CMP: CPT | Performed by: FAMILY MEDICINE

## 2024-05-22 PROCEDURE — 83036 HEMOGLOBIN GLYCOSYLATED A1C: CPT | Performed by: FAMILY MEDICINE

## 2024-05-22 PROCEDURE — 36415 COLL VENOUS BLD VENIPUNCTURE: CPT | Performed by: FAMILY MEDICINE

## 2024-05-22 PROCEDURE — 80061 LIPID PANEL: CPT | Performed by: FAMILY MEDICINE

## 2024-05-22 PROCEDURE — 80053 COMPREHEN METABOLIC PANEL: CPT | Performed by: FAMILY MEDICINE

## 2024-05-22 PROCEDURE — 99396 PREV VISIT EST AGE 40-64: CPT | Performed by: FAMILY MEDICINE

## 2024-05-22 SDOH — HEALTH STABILITY: PHYSICAL HEALTH: ON AVERAGE, HOW MANY MINUTES DO YOU ENGAGE IN EXERCISE AT THIS LEVEL?: 120 MIN

## 2024-05-22 SDOH — HEALTH STABILITY: PHYSICAL HEALTH: ON AVERAGE, HOW MANY DAYS PER WEEK DO YOU ENGAGE IN MODERATE TO STRENUOUS EXERCISE (LIKE A BRISK WALK)?: 5 DAYS

## 2024-05-22 ASSESSMENT — PATIENT HEALTH QUESTIONNAIRE - PHQ9
SUM OF ALL RESPONSES TO PHQ QUESTIONS 1-9: 2
SUM OF ALL RESPONSES TO PHQ QUESTIONS 1-9: 2

## 2024-05-22 ASSESSMENT — SOCIAL DETERMINANTS OF HEALTH (SDOH): HOW OFTEN DO YOU GET TOGETHER WITH FRIENDS OR RELATIVES?: MORE THAN THREE TIMES A WEEK

## 2024-05-22 NOTE — PATIENT INSTRUCTIONS
"Preventive Care Advice   This is general advice we often give to help people stay healthy. Your care team may have specific advice just for you. Please talk to your care team about your own preventive care needs.  Lifestyle  Exercise at least 150 minutes each week (30 minutes a day, 5 days a week).  Do muscle strengthening activities 2 days a week. These help control your weight and prevent disease.  No smoking.  Wear sunscreen to prevent skin cancer.  Have your home tested for radon every 2 to 5 years. Radon is a colorless, odorless gas that can harm your lungs. To learn more, go to www.health.[facility].mn. and search for \"Radon in Homes.\"  Keep guns unloaded and locked up in a safe place like a safe or gun vault, or, use a gun lock and hide the keys. Always lock away bullets separately. To learn more, visit [employer]id[place]s.[place].gov and search for \"safe gun storage.\"  Nutrition  Eat 5 or more servings of fruits and vegetables each day.  Try wheat bread, brown rice and whole grain pasta (instead of white bread, rice, and pasta).  Get enough calcium and vitamin D. Check the label on foods and aim for 100% of the RDA (recommended daily allowance).  Regular exams  Have a dental exam and cleaning every 6 months.  See your health care team every year to talk about:  Any changes in your health.  Any medicines your care team has prescribed.  Preventive care, family planning, and ways to prevent chronic diseases.  Shots (vaccines)   HPV shots (up to age 26), if you've never had them before.  Hepatitis B shots (up to age 59), if you've never had them before.  COVID-19 shot: Get this shot when it's due.  Flu shot: Get a flu shot every year.  Tetanus shot: Get a tetanus shot every 10 years.  Pneumococcal, hepatitis A, and RSV shots: Ask your care team if you need these based on your risk.  Shingles shot (for age 50 and up).  General health tests  Diabetes screening:  Starting at age 35, Get screened for diabetes at least every 3 years.  If " you are younger than age 35, ask your care team if you should be screened for diabetes.  Cholesterol test: At age 39, start having a cholesterol test every 5 years, or more often if advised.  Bone density scan (DEXA): At age 50, ask your care team if you should have this scan for osteoporosis (brittle bones).  Hepatitis C: Get tested at least once in your life.  Abdominal aortic aneurysm screening: Talk to your doctor about having this screening if you:  Have ever smoked; and  Are biologically male; and  Are between the ages of 65 and 75.  STIs (sexually transmitted infections)  Before age 24: Ask your care team if you should be screened for STIs.  After age 24: Get screened for STIs if you're at risk. You are at risk for STIs (including HIV) if:  You are sexually active with more than one person.  You don't use condoms every time.  You or a partner was diagnosed with a sexually transmitted infection.  If you are at risk for HIV, ask about PrEP medicine to prevent HIV.  Get tested for HIV at least once in your life, whether you are at risk for HIV or not.  Cancer screening tests  Cervical cancer screening: If you have a cervix, begin getting regular cervical cancer screening tests at age 21. Most people who have regular screenings with normal results can stop after age 65. Talk about this with your provider.  Breast cancer scan (mammogram): If you've ever had breasts, begin having regular mammograms starting at age 40. This is a scan to check for breast cancer.  Colon cancer screening: It is important to start screening for colon cancer at age 45.  Have a colonoscopy test every 10 years (or more often if you're at risk) Or, ask your provider about stool tests like a FIT test every year or Cologuard test every 3 years.  To learn more about your testing options, visit: www.[employer]/642537.pdf.  For help making a decision, visit: rivera/ye18837.  Prostate cancer screening test: If you have a prostate and are age 55  to 69, ask your provider if you would benefit from a yearly prostate cancer screening test.  Lung cancer screening: If you are a current or former smoker age 50 to 80, ask your care team if ongoing lung cancer screenings are right for you.  For informational purposes only. Not to replace the advice of your health care provider. Copyright   2023 [employer]. All rights reserved. Clinically reviewed by the [facility]. [place] [id number] - REV 04/24.

## 2024-05-22 NOTE — PROGRESS NOTES
"Preventive Care Visit  [facility]  THONG MONIQUE MD, Family Medicine  May 22, 2024      Assessment & Plan   Problem List Items Addressed This Visit       Major depressive disorder with single episode     The patient continues in clinical remission on fluoxetine 40 mg daily.         Class 1 obesity due to excess calories with serious comorbidity and body mass index (BMI) of 33.0 to 33.9 in adult     The patient has experienced significant weight loss response to Mounjaro prescribed for her type 2 diabetes.  She has lost 71 pounds at this point.         Relevant Medications    tirzepatide (MOUNJARO) 10 MG/0.5ML pen    Type 2 diabetes mellitus without complication, without long-term current use of insulin (H)     The patient has lost about 25% of her weight since starting Mounjaro and feels the medication is working very well.  She has no significant side effects with the medication.  She feels she is adhering to a healthy diet and is working and exercise on a regular basis as well.  She continues on Crestor 5 mg daily.         Relevant Medications    tirzepatide (MOUNJARO) 10 MG/0.5ML pen    Other Relevant Orders    HEMOGLOBIN A1C (Completed)    Comprehensive metabolic panel (BMP + Alb, Alk Phos, ALT, AST, Total. Bili, TP) (Completed)    Lipid Profile (Chol, Trig, HDL, LDL calc) (Completed)    Chronic midline low back pain without sciatica     The patient continues on Celebrex 200 mg daily for this issue and overall feels things are under reasonably good control.          Other Visit Diagnoses       Routine general medical examination at a health care facility    -  Primary    Screen for colon cancer        Relevant Orders    Colonoscopy Screening  Referral                BMI  Estimated body mass index is 33.69 kg/m  as calculated from the following:    Height as of this encounter: 1.676 m (5' 6\").    Weight as of this encounter: 94.7 kg (208 lb 11.2 oz). "       Counseling  Appropriate preventive services were discussed with this patient, including applicable screening as appropriate for fall prevention, nutrition, physical activity, Tobacco-use cessation, weight loss and cognition.  Checklist reviewing preventive services available has been given to the patient.  Reviewed patient's diet, addressing concerns and/or questions.         Ben Aggarwal is a 64 year old female presenting today for an annual physical exam and medication check.  The patient has a history of type 2 diabetes for which she was prescribed Mounjaro and is doing very well with that medication.  She is tolerating it well and has lost 71 pounds.  She is adhering to a diabetic diet and incorporating exercise into her routine.  She has no specific complaints or concerns today.  Physical         Health Care Directive  Patient does not have a Health Care Directive or Living Will: Patient states has Advance Directive and will bring in a copy to clinic.    HPI  ***  {MA/LPN/RN Pre-Provider Visit Orders- hCG/UA/Strep (Optional):596016}  {SUPERLIST (Optional):986205}  {additonal problems for provider to add (Optional):410452}       No data to display                   No data to display                   No data to display                      No data to display                   { Rooming Staff Patient needs a PHQ as part of the AWV.  Use this link to complete and then refresh the note to pull results Link to PHQ9 Assessment :838080}  {USE TO PULL IN PHQ RESULTS FOR TODAY:585048}         No data to display              Social History     Tobacco Use    Smoking status: Never    Smokeless tobacco: Never   Substance Use Topics    Alcohol use: No    Drug use: No     {Provider  If there are gaps in the social history shown above, please follow the link to update and then refresh the note Link to Social and Substance History :659499}     {Mammogram Decision Support (Optional):773450}      History of  "abnormal Pap smear: { :127729}        Latest Ref Rng & Units 6/9/2021    10:04 AM 8/2/2016     3:14 PM   PAP / HPV   PAP Negative for squamous intraepithelial lesion or malignancy. Negative for squamous intraepithelial lesion or malignancy  Electronically signed by Chandrika Rashid CT (ASCP) on 6/16/2021 at  2:55 PM    Negative for squamous intraepithelial lesion or malignancy  Electronically signed by Jessica Strange CT (ASCP) on 8/9/2016 at  1:01 PM      HPV 16 DNA NEG Negative     HPV 18 DNA NEG Negative     Other HR HPV NEG Negative       ASCVD Risk   The 10-year ASCVD risk score (Esa MONTOYA, et al., 2019) is: 6.3%    Values used to calculate the score:      Age: 64 years      Sex: Female      Is Non- : No      Diabetic: Yes      Tobacco smoker: No      Systolic Blood Pressure: 137 mmHg      Is BP treated: No      HDL Cholesterol: 70 mg/dL      Total Cholesterol: 148 mg/dL    {Link to Fracture Risk Assessment Tool (Optional):776726}    {Provider  Use the storyboard to review patient history, after sections have been marked as reviewed, refresh note to capture documentation:148163}   Reviewed and updated as needed this visit by Provider                    {HISTORY OPTIONS (Optional):995978}    {ROS Picklists (Optional):153817}     Objective    Exam  /69 (BP Location: Left arm, Patient Position: Left side, Cuff Size: Adult Large)   Pulse 71   Temp 98.5  F (36.9  C) (Oral)   Resp 18   Ht 1.676 m (5' 6\")   Wt 94.7 kg (208 lb 11.2 oz)   SpO2 98%   BMI 33.69 kg/m     Estimated body mass index is 33.69 kg/m  as calculated from the following:    Height as of this encounter: 1.676 m (5' 6\").    Weight as of this encounter: 94.7 kg (208 lb 11.2 oz).    Physical Exam  {Exam Choices (Optional):419418}        Signed Electronically by: THONG MONIQUE MD  {Email feedback regarding this note to [email or web address]   :030986}  "

## 2024-05-23 LAB
ALBUMIN SERPL BCG-MCNC: 4.2 G/DL (ref 3.5–5.2)
ALP SERPL-CCNC: 62 U/L (ref 40–150)
ALT SERPL W P-5'-P-CCNC: 16 U/L (ref 0–50)
ANION GAP SERPL CALCULATED.3IONS-SCNC: 9 MMOL/L (ref 7–15)
AST SERPL W P-5'-P-CCNC: 16 U/L (ref 0–45)
BILIRUB SERPL-MCNC: 0.3 MG/DL
BUN SERPL-MCNC: 22 MG/DL (ref 8–23)
CALCIUM SERPL-MCNC: 9.1 MG/DL (ref 8.8–10.2)
CHLORIDE SERPL-SCNC: 104 MMOL/L (ref 98–107)
CHOLEST SERPL-MCNC: 148 MG/DL
CREAT SERPL-MCNC: 0.71 MG/DL (ref 0.51–0.95)
DEPRECATED HCO3 PLAS-SCNC: 27 MMOL/L (ref 22–29)
EGFRCR SERPLBLD CKD-EPI 2021: >90 ML/MIN/1.73M2
FASTING STATUS PATIENT QL REPORTED: NO
FASTING STATUS PATIENT QL REPORTED: NO
GLUCOSE SERPL-MCNC: 90 MG/DL (ref 70–99)
HDLC SERPL-MCNC: 70 MG/DL
LDLC SERPL CALC-MCNC: 66 MG/DL
NONHDLC SERPL-MCNC: 78 MG/DL
POTASSIUM SERPL-SCNC: 4.3 MMOL/L (ref 3.4–5.3)
PROT SERPL-MCNC: 6.6 G/DL (ref 6.4–8.3)
SODIUM SERPL-SCNC: 140 MMOL/L (ref 135–145)
TRIGL SERPL-MCNC: 60 MG/DL

## 2024-05-23 NOTE — PROGRESS NOTES
"Preventive Care Visit  [facility]  THONG MONIQUE MD, Family Medicine  May 22, 2024      Assessment & Plan   Problem List Items Addressed This Visit       Major depressive disorder with single episode     The patient continues in clinical remission on fluoxetine 40 mg daily.         Class 1 obesity due to excess calories with serious comorbidity and body mass index (BMI) of 33.0 to 33.9 in adult     The patient has experienced significant weight loss response to Mounjaro prescribed for her type 2 diabetes.  She has lost 71 pounds at this point.         Relevant Medications    tirzepatide (MOUNJARO) 10 MG/0.5ML pen    Type 2 diabetes mellitus without complication, without long-term current use of insulin (H)     The patient has lost about 25% of her weight since starting Mounjaro and feels the medication is working very well.  She has no significant side effects with the medication.  She feels she is adhering to a healthy diet and is working and exercise on a regular basis as well.  She continues on Crestor 5 mg daily.         Relevant Medications    tirzepatide (MOUNJARO) 10 MG/0.5ML pen    Other Relevant Orders    HEMOGLOBIN A1C (Completed)    Comprehensive metabolic panel (BMP + Alb, Alk Phos, ALT, AST, Total. Bili, TP) (Completed)    Lipid Profile (Chol, Trig, HDL, LDL calc) (Completed)    Chronic midline low back pain without sciatica     The patient continues on Celebrex 200 mg daily for this issue and overall feels things are under reasonably good control.          Other Visit Diagnoses       Routine general medical examination at a health care facility    -  Primary    Screen for colon cancer        Relevant Orders    Colonoscopy Screening  Referral                BMI  Estimated body mass index is 33.69 kg/m  as calculated from the following:    Height as of this encounter: 1.676 m (5' 6\").    Weight as of this encounter: 94.7 kg (208 lb 11.2 oz). "       Counseling  Appropriate preventive services were discussed with this patient, including applicable screening as appropriate for fall prevention, nutrition, physical activity, Tobacco-use cessation, weight loss and cognition.  Checklist reviewing preventive services available has been given to the patient.  Reviewed patient's diet, addressing concerns and/or questions.           Ben Aggarwal is a 64 year old female presenting today for an annual physical exam and medication check.  The patient has a history of type 2 diabetes for which she was prescribed Mounjaro and is doing very well with that medication.  She is tolerating it well and has lost 71 pounds.  She is adhering to a diabetic diet and incorporating exercise into her routine.  She has no specific complaints or concerns today.  Physical        5/22/2024     3:15 PM   Additional Questions   Roomed by as   Accompanied by self         5/22/2024     3:15 PM   Patient Reported Additional Medications   Patient reports taking the following new medications no        Health Care Directive  Patient does not have a Health Care Directive or Living Will: Patient states has Advance Directive and will bring in a copy to clinic.          5/22/2024   General Health   How would you rate your overall physical health? Good   Feel stress (tense, anxious, or unable to sleep) Not at all         5/22/2024   Nutrition   Three or more servings of calcium each day? Yes   Diet: Carbohydrate counting   How many servings of fruit and vegetables per day? 4 or more   How many sweetened beverages each day? 0-1         5/22/2024   Exercise   Days per week of moderate/strenous exercise 5 days   Average minutes spent exercising at this level 120 min         5/22/2024   Social Factors   Frequency of gathering with friends or relatives More than three times a week   Worry food won't last until get money to buy more No   Food not last or not have enough money for food? No   Do you  have housing?  Yes   Are you worried about losing your housing? No   Lack of transportation? No   Unable to get utilities (heat,electricity)? No         5/22/2024   Fall Risk   Fallen 2 or more times in the past year? No   Trouble with walking or balance? No          5/22/2024   Dental   Dentist two times every year? Yes         5/22/2024   TB Screening   Were you born outside of the [place]? No       Today's PHQ-9 Score:       5/22/2024     3:17 PM   PHQ-9 SCORE   PHQ-9 Total Score MyChart 2 (Minimal depression)   PHQ-9 Total Score 2         5/22/2024   Substance Use   Alcohol more than 3/day or more than 7/wk No   Do you use any other substances recreationally? No     Social History     Tobacco Use    Smoking status: Never    Smokeless tobacco: Never   Substance Use Topics    Alcohol use: No    Drug use: No             5/22/2024   Breast Cancer Screening   Family history of breast, colon, or ovarian cancer? No / Unknown      Mammogram Screening - Mammogram every 1-2 years updated in Health Maintenance based on mutual decision making        5/22/2024   STI Screening   New sexual partner(s) since last STI/HIV test? No     History of abnormal Pap smear: No - age 30- 64 PAP with HPV every 5 years recommended        Latest Ref Rng & Units 6/9/2021    10:04 AM 8/2/2016     3:14 PM   PAP / HPV   PAP Negative for squamous intraepithelial lesion or malignancy. Negative for squamous intraepithelial lesion or malignancy  Electronically signed by Chandrika Rashid CT (ASCP) on 6/16/2021 at  2:55 PM    Negative for squamous intraepithelial lesion or malignancy  Electronically signed by Jessica Strange CT (ASCP) on 8/9/2016 at  1:01 PM      HPV 16 DNA NEG Negative     HPV 18 DNA NEG Negative     Other HR HPV NEG Negative       ASCVD Risk   The 10-year ASCVD risk score (Esa MONTOYA, et al., 2019) is: 6.2%    Values used to calculate the score:      Age: 64 years      Sex: Female      Is Non- :  "No      Diabetic: Yes      Tobacco smoker: No      Systolic Blood Pressure: 137 mmHg      Is BP treated: No      HDL Cholesterol: 69 mg/dL      Total Cholesterol: 142 mg/dL          Reviewed and updated as needed this visit by Provider                    Patient Active Problem List   Diagnosis    Major depressive disorder with single episode    S/P laparoscopic cholecystectomy    Tubulovillous adenoma of colon    Class 1 obesity due to excess calories with serious comorbidity and body mass index (BMI) of 33.0 to 33.9 in adult    History of eating disorder    Type 2 diabetes mellitus without complication, without long-term current use of insulin (H)    Chronic midline low back pain without sciatica     Past Surgical History:   Procedure Laterality Date    LAPAROSCOPIC CHOLECYSTECTOMY N/A 8/24/2016    Procedure: CHOLECYSTECTOMY ;  Surgeon: Devonte Lord MD;  Location: [facility];  Service:     OOPHORECTOMY Right     ND LAP,SURG,COLECTOMY, PARTIAL, W/ANAST N/A 8/24/2016    Procedure: LAPAROSCOPIC CECECTOMY AND;  Surgeon: Devonte Lord MD;  Location: [facility];  Service: General       Social History     Tobacco Use    Smoking status: Never    Smokeless tobacco: Never   Substance Use Topics    Alcohol use: No     No family history on file.      Current Outpatient Medications   Medication Sig Dispense Refill    celecoxib (CELEBREX) 200 MG capsule       FLUoxetine (PROZAC) 20 MG capsule 40 mg      rosuvastatin (CRESTOR) 5 MG tablet TAKE 1 TABLET BY MOUTH EVERY DAY 90 tablet 3    tirzepatide (MOUNJARO) 10 MG/0.5ML pen Inject 10 mg Subcutaneous every 7 days 2 mL 0     Allergies   Allergen Reactions    Lisinopril Cough          Objective    Exam  /69 (BP Location: Left arm, Patient Position: Left side, Cuff Size: Adult Large)   Pulse 71   Temp 98.5  F (36.9  C) (Oral)   Resp 18   Ht 1.676 m (5' 6\")   Wt 94.7 kg (208 lb 11.2 oz)   SpO2 98%   BMI 33.69 kg/m     Estimated body mass index is " "33.69 kg/m  as calculated from the following:    Height as of this encounter: 1.676 m (5' 6\").    Weight as of this encounter: 94.7 kg (208 lb 11.2 oz).    Physical Exam  GENERAL: alert and no distress  EYES: Eyes grossly normal to inspection, PERRL and conjunctivae and sclerae normal  HENT: ear canals and TM's normal, nose and mouth without ulcers or lesions  NECK: no adenopathy, no asymmetry, masses, or scars  RESP: lungs clear to auscultation - no rales, rhonchi or wheezes  BREAST: normal without masses, tenderness or nipple discharge and no palpable axillary masses or adenopathy  CV: regular rate and rhythm, normal S1 S2, no S3 or S4, no murmur, click or rub, no peripheral edema  ABDOMEN: soft, nontender, no hepatosplenomegaly, no masses and bowel sounds normal  MS: no gross musculoskeletal defects noted, no edema  SKIN: no suspicious lesions or rashes  NEURO: Normal strength and tone, mentation intact and speech normal  PSYCH: mentation appears normal, affect normal/bright        Signed Electronically by: THONG MONIUQE MD    Answers submitted by the patient for this visit:  Patient Health Questionnaire (Submitted on 5/22/2024)  PHQ9 TOTAL SCORE: 2    "

## 2024-05-24 NOTE — ASSESSMENT & PLAN NOTE
The patient has experienced significant weight loss response to Mounjaro prescribed for her type 2 diabetes.  She has lost 71 pounds at this point.

## 2024-05-24 NOTE — ASSESSMENT & PLAN NOTE
The patient continues on Celebrex 200 mg daily for this issue and overall feels things are under reasonably good control.

## 2024-05-24 NOTE — ASSESSMENT & PLAN NOTE
The patient has lost about 25% of her weight since starting Mounjaro and feels the medication is working very well.  She has no significant side effects with the medication.  She feels she is adhering to a healthy diet and is working and exercise on a regular basis as well.  She continues on Crestor 5 mg daily.

## 2024-05-28 ENCOUNTER — PATIENT OUTREACH (OUTPATIENT)
Dept: GASTROENTEROLOGY | Facility: CLINIC | Age: 64
End: 2024-05-28
Payer: COMMERCIAL

## 2024-06-03 NOTE — PROGRESS NOTES
CPAP Follow-Up Visit:    Date on this visit: 6/4/2024    Alessia Thakur has a follow-up visit today to review her CPAP use for NICKY.     Previous Study Results:   Date: 9/12/17.  Weight 269 pounds.   AHI: 34.1/hr. O2 ronny 78%. 12 min below 89%  Titration 10/1/2017. CPAP 8 cm effectively resolved apnea and hypoxemia        ResMed   Auto-PAP 8.0 - 12.0 cmH2O 30 day usage data:    96% of days with > 4 hours of use. 0/30 days with no use.   Average use 548 minutes per day.   95%ile Leak 47.85 L/min.   CPAP 95% pressure 11.9 cm.   AHI 2.23 events per hour.       She switched from an N30i mask to a full face mask due to mouth breathing.   Her ramp starts at 5 cm. She has to hit the ramp a few times per night. She does feel the pressure is too high even when not leaking. She gets a terrible dry mouth with the full face mask, more than she did with the nasal pillows mask.   Just prior to switching masks, she had a lot of sneezing. That stopped with switching to the full face mask. She was getting sinus congestion as well.  She would like a prescription for a replacement machine.        No specialty comments available.    Do you use a CPAP Machine at home: Yes  Overall, on a scale of 0-10 how would you rate your CPAP (0 poor, 10 great):      What type of mask do you use: Full Face Mask Vitera, switched from medium to small  Is your mask comfortable: No  If not, why: leaks  How often do you replace supplies:    Is your mask leaking: Yes  If yes, where do you feel it: top and sides once air pressure increases  How many night per week does the mask leak (0-7): 7    Do you notice snoring with mask on: No  Do you notice gasping arousals with mask on: No  Are you having significant oral or nasal dryness: Yes  Are you using the humidifier: yes  Does the water chamber run out before the night is over:yes  Do you get condensation in the mask or hose:no  Is the pressure setting comfortable:   feels too high  If not, why:      Typical  bedtime: 11  Sleep latency on PAP therapy: 1 to 2 hours, sometimes reads, but that keeps her awake  Typical wake time: 9 AM. She is a night owl  Wakes 5-7 times per night for ? Minutes, tosses and turns. Reason for waking: flips side to side, adjusts mask  How many hours on average per night are you using PAP therapy: 9 hours  How many hours are you sleeping per night: 6-7 hours  Do you feel well rested in the morning: No    Naps: dozes around 4 PM, rests more than naps for about 30 minutes.         Weight change since sleep study: 209 lbs      Past medical/surgical history, family history, social history, medications and allergies were reviewed.      Problem List:  Patient Active Problem List    Diagnosis Date Noted    Chronic midline low back pain without sciatica 05/22/2024     Priority: Medium    History of eating disorder 09/15/2022     Priority: Medium    Type 2 diabetes mellitus without complication, without long-term current use of insulin (H) 09/15/2022     Priority: Medium    Class 1 obesity due to excess calories with serious comorbidity and body mass index (BMI) of 33.0 to 33.9 in adult 02/17/2022     Priority: Medium    S/P laparoscopic cholecystectomy 05/22/2021     Priority: Medium    Tubulovillous adenoma of colon 08/24/2016     Priority: Medium    Major depressive disorder with single episode 08/02/2016     Priority: Medium        Impression/Plan:    (G47.33) NICKY (obstructive sleep apnea)  (primary encounter diagnosis)  Comment: Alessia used to do very well with CPAP but started having mouth leak. She switched from an N30i to a Vitera full face mask in the last 6 months and has had a lot of trouble getting the mask to seal. She has lost 60#. Her pressures may be too high for her now. Her pressures were capped at 12 cm instead of 16 cm. Her pressures are often at the upper limit, but may be sensing vibration from the mask leak and mistaking it for snoring. She does feel the pressures are too  high.  Plan: Comprehensive DME        Order placed for a replacement machine. I changed the pressures to 6-12 cm, set the EPR to 3 and response setting to soft. Consider trying the nasal mask again or try the AirFit F30i if still having mouth leak. Try the mask fit check function to ensure a good seal at higher pressures before going to sleep. She was shown CPAP pillows and mouth tape but did not seem interested in those options.  She was encouraged to track her leak data in the morning to see if she is keeping a good seal at night. She was a bit disappointed that I did not have a quick solution or masks to try on at the visit. I recommended she look at various masks when she gets her replacement machine.     (F51.04) Chronic insomnia  Comment: It takes Alessia 1-3 hours to get to sleep. She is a night owl and goes to bed earlier than she would normally so she can go to bed with her . She gets into reading with her [place] and feels that may keep her up. She is in bed 11 PM to 9 AM. She also rests/naps for 30 minutes around 4 PM.  Plan: Reduce time in bed to 8 hours. Limit time in bed to 1-9 AM. Skip the nap.     She will follow up with me in about 6 month(s). You may push that out further if you do not need it.     52 minutes were spent on the date of the encounter doing chart review, history and exam, documentation and further activities as noted above.     Bennett Goltz, PA-C    CC: No ref. provider found

## 2024-06-04 ENCOUNTER — OFFICE VISIT (OUTPATIENT)
Dept: SLEEP MEDICINE | Facility: CLINIC | Age: 64
End: 2024-06-04
Payer: COMMERCIAL

## 2024-06-04 VITALS
HEART RATE: 62 BPM | DIASTOLIC BLOOD PRESSURE: 77 MMHG | SYSTOLIC BLOOD PRESSURE: 149 MMHG | WEIGHT: 209.4 LBS | BODY MASS INDEX: 33.65 KG/M2 | OXYGEN SATURATION: 97 % | HEIGHT: 66 IN

## 2024-06-04 DIAGNOSIS — G47.33 OSA (OBSTRUCTIVE SLEEP APNEA): Primary | ICD-10-CM

## 2024-06-04 DIAGNOSIS — F51.04 CHRONIC INSOMNIA: ICD-10-CM

## 2024-06-04 PROCEDURE — 99215 OFFICE O/P EST HI 40 MIN: CPT | Performed by: PHYSICIAN ASSISTANT

## 2024-06-04 ASSESSMENT — SLEEP AND FATIGUE QUESTIONNAIRES
HOW LIKELY ARE YOU TO NOD OFF OR FALL ASLEEP WHILE SITTING AND READING: WOULD NEVER DOZE
HOW LIKELY ARE YOU TO NOD OFF OR FALL ASLEEP WHILE SITTING QUIETLY AFTER LUNCH WITHOUT ALCOHOL: WOULD NEVER DOZE
HOW LIKELY ARE YOU TO NOD OFF OR FALL ASLEEP WHILE SITTING AND TALKING TO SOMEONE: WOULD NEVER DOZE
HOW LIKELY ARE YOU TO NOD OFF OR FALL ASLEEP IN A CAR, WHILE STOPPED FOR A FEW MINUTES IN TRAFFIC: WOULD NEVER DOZE
HOW LIKELY ARE YOU TO NOD OFF OR FALL ASLEEP WHEN YOU ARE A PASSENGER IN A CAR FOR AN HOUR WITHOUT A BREAK: WOULD NEVER DOZE
HOW LIKELY ARE YOU TO NOD OFF OR FALL ASLEEP WHILE SITTING INACTIVE IN A PUBLIC PLACE: WOULD NEVER DOZE
HOW LIKELY ARE YOU TO NOD OFF OR FALL ASLEEP WHILE LYING DOWN TO REST IN THE AFTERNOON WHEN CIRCUMSTANCES PERMIT: HIGH CHANCE OF DOZING
HOW LIKELY ARE YOU TO NOD OFF OR FALL ASLEEP WHILE WATCHING TV: WOULD NEVER DOZE

## 2024-06-04 NOTE — PATIENT INSTRUCTIONS
Schedule a mask fitting appointment with [facility]. Consider the AirFit F30i mask.   Use the mask fit check function to test your mask fit at higher pressure    General recommendations for sleep problems (Insomnia)  Allow 2-4 weeks to see results     Establish a regular sleep schedule    Most people only need 7-8 hours of sleep.  Don't be in bed longer than you need     to sleep or you will end up spending more time awake in bed. This trains your    brain to think of the bed as a place to not sleep.  Go to bed at same time each night   Get up at same time each day - Set an alarm everyday (even weekends). This is one of    the most important tips. It prevents you from relying on your insomnia to get you    up on time for your day. That actually reinforces insomnia. It also will help your    body get into a pattern where you start feeling tired at a consistent time each    night.  The body functions best when you keep a consistent routine.  Avoid sleeping-in and napping. Anytime you sleep during the day, you will be less tired at    night. You may be tired enough to fall asleep, but you will wake more in the    middle of the night because you will have met your sleep need before the night is    done.   Cut down time in bed (if not asleep, get up)- Use your bed only for sleep and sex    Anytime you spend time in bed doing activities other than sleep (reading,    watching TV, working, playing on the computer or phone, or even just laying in    bed trying to sleep), you are training  your brain to think of the bed as a place to    do activities other than sleep. If you are not falling asleep within 20-30 minutes,    get out of bed. While out of bed, avoid bright lights. Avoid work or chores. Being    productive in the middle of the night reinforces waking up at night. Find relaxing,    not particularly entertaining activities like reading, listening to music, or relaxation    exercises. Go back to bed if you  start feeling groggy, or after about 30 minutes,    even if not feeling very tired. Sometimes, just getting out of bed stops the pattern    of getting frustrated about laying in bed not sleeping, and that can help you fall    asleep.   Avoid trying to force yourself to sleep- sleep is not like everything else. The harder you    work at most things, the more you can accomplish. The harder you work at    sleep, the less you will sleep.     Make the bedroom comfortable - quiet, dark and cool are better. Consider ear plugs    (silicon). Use dark blinds or wear an eye mask if needed     Make a relaxing routine prior to bedtime  Relaxation exercises:   Progressive muscle relaxation: Relax each muscle group individually    Begin with your feet, flex, then relax. Try to imagine your feet feeling heavy and sinking into the bed. Move to your calves, do the same thing. Work through each muscle group toward your head.    Relaxing Mental Imagery: Try to imagine a trip that you took and found relaxing, or imagine a day at the beach. Try to walk yourself through the day in your mind as if you were dreaming it. Try to imagine sensing the different experiences, such as feeling sand between your toes, the heat of the sun on your skin, seeing the waves crashing the shore, the smell of the salt water, etc.     Deal with your worries before bedtime    Set aside a worry time around dinner time for 10-15 minutes. Write down the    things that are on your mind. Plan time in the coming days to address those    issues. Brainstorm ideas on how you will deal with them. Try to identify issues    that are out of your control, and try to let those issues go.  Listen to relaxation tapes   Classical Music or Nature sounds   Back Massage   Get regular exercise each day (at least 1-2 hours before bedtime)   Take medications only as directed   Eat a light bedtime snack or warm drink   Warm milk   Warm herbal tea (non-caffeinated)       Things to avoid    No overstimulating activities just before bed   No competitive games before bedtime   No exciting television programs before bedtime   Avoid caffeine after lunchtime   Avoid chocolate   Do not use alcohol to induce sleep (worsens Insomnia)   Do not take someone else's sleeping pills   Do not look at the clock when awakening   Do not turn on light when getting up to use bathroom, use a nightlight     Online Programs   www.[employer]UT[employer] Mobile Majority (pronounced shut eye). There is a fee for this program. Enter the code  [facility]  if you decide to enroll in this program.    www.sleepDimension Therapeutics.com (pronounced sleep ee oh). There is a fee for this program. Enter the code  [facility]  if you decide to enroll in this program.     Suggested Resources  Insomnia Treatment Books   Overcoming Insomnia by Master Teran and Rehana Chiu (2008)  No More Sleepless Nights by Devonte Rosa and Juana Tompkins (1996)  Say Behzad to Insomnia by Lucas Flro (2009)  The Insomnia Workbook by July Louis and Kevin Pappas (2009)  The Insomnia Answer by David Fernandez and Toney Schafer (2006)      Stress Management and Relaxation Books  The Relaxation and Stress Reduction Workbook by Charline Cotton, Loulou Page and Yrn Pagan (2008)  Stress Management Workbook: Techniques and Self-Assessment Procedures by Giovana Irizarry and Alejandro Loredo (1997)  A Mindfulness-Based Stress Reduction Workbook by Flaquito Cooney and Ara Watson (2010)  The Complete Stress Management Workbook by Jose Gregg, Elkin Braga and Ji Rea (1996)  Assert Yourself by Dianne Puente and Ishaan Puente (1977)    Relaxation Resources for Computer Download   These websites offer resources to help you relax. This list is for information only. [facility] is not responsible for the quality of services or the actions of any person or organization.  Progressive Muscle Relaxation (PMR):    http://www.X[employer]y/progressive-muscle-relaxation-exercise.html   http://studentsupport.[facility]/counseling/resources/self-help/relaxation-and-stress-management/   Deep Breathing Exercises:  http://www.X[employer]y/breathing-awareness.html     Meditation:   wwwLydia  www.[employer] Ins[employer] LtdguidedZaBeCor Pharmaceuticalsmeditation-site.[employer] You may have to pay for some of these resources.    Guided Imagery:  http://www.Xconomy/guided-imagery-scripts.html   http://EmbedStore/library/ireuhszlye-iyjmzm-vskgijn/   Consider phone apps such as: Calm, Headspace or Insight Timer.    Counseling / Behavioral Health  [facility]  Visit [email or web address] or call [phone] to find a clinic close to you.  Or call [phone] for [facility].

## 2024-06-04 NOTE — NURSING NOTE
"Chief Complaint   Patient presents with    CPAP Follow Up     CPAP follow up, needs new machine       Initial BP (!) 149/77   Pulse 62   Ht 1.676 m (5' 5.98\")   Wt 95 kg (209 lb 6.4 oz)   SpO2 97%   BMI 33.81 kg/m   Estimated body mass index is 33.81 kg/m  as calculated from the following:    Height as of this encounter: 1.676 m (5' 5.98\").    Weight as of this encounter: 95 kg (209 lb 6.4 oz).    Medication Reconciliation: complete  ESS 3  Nima Colbert MA         "

## 2024-06-11 ENCOUNTER — DOCUMENTATION ONLY (OUTPATIENT)
Dept: SLEEP MEDICINE | Facility: CLINIC | Age: 64
End: 2024-06-11
Payer: COMMERCIAL

## 2024-06-11 DIAGNOSIS — G47.33 OBSTRUCTIVE SLEEP APNEA (ADULT) (PEDIATRIC): Primary | ICD-10-CM

## 2024-06-11 NOTE — PROGRESS NOTES
Patient was offered choice of vendor and chose Replaced by [facility].  Patient Alessia TOMAS Comfort was set up at [place]  on June 11, 2024. Patient received a Resmed Airsense 10 Pressures were set at  6-12 cm H2O.   Patient s ramp is 5 cm H2O for Auto and FLEX/EPR is EPR, 2.  Patient received a heated humidifier.  Patient has the following compliance requirements: none   Alpesh Paez

## 2024-06-13 ENCOUNTER — TELEPHONE (OUTPATIENT)
Dept: FAMILY MEDICINE | Facility: CLINIC | Age: 64
End: 2024-06-13

## 2024-06-13 ENCOUNTER — MYC MEDICAL ADVICE (OUTPATIENT)
Dept: FAMILY MEDICINE | Facility: CLINIC | Age: 64
End: 2024-06-13
Payer: COMMERCIAL

## 2024-06-13 DIAGNOSIS — E11.9 TYPE 2 DIABETES MELLITUS WITHOUT COMPLICATION, WITHOUT LONG-TERM CURRENT USE OF INSULIN (H): Primary | ICD-10-CM

## 2024-06-13 NOTE — TELEPHONE ENCOUNTER
Patient Quality Outreach    Patient is due for the following:   Hypertension -  BP check    Next Steps:   Schedule a nurse only visit for blood pressure or take it from home    Type of outreach:    Sent [employer] message.      Questions for provider review:    None           Britni Lou MA  Chart routed to Britni.

## 2024-07-15 ENCOUNTER — MYC MEDICAL ADVICE (OUTPATIENT)
Dept: FAMILY MEDICINE | Facility: CLINIC | Age: 64
End: 2024-07-15
Payer: COMMERCIAL

## 2024-07-15 DIAGNOSIS — E11.9 TYPE 2 DIABETES MELLITUS WITHOUT COMPLICATION, WITHOUT LONG-TERM CURRENT USE OF INSULIN (H): Primary | ICD-10-CM

## 2024-08-11 DIAGNOSIS — E11.9 TYPE 2 DIABETES MELLITUS WITHOUT COMPLICATION, WITHOUT LONG-TERM CURRENT USE OF INSULIN (H): ICD-10-CM

## 2024-08-12 RX ORDER — ROSUVASTATIN CALCIUM 5 MG/1
5 TABLET, COATED ORAL DAILY
Qty: 90 TABLET | Refills: 2 | Status: SHIPPED | OUTPATIENT
Start: 2024-08-12

## 2024-10-26 ENCOUNTER — HEALTH MAINTENANCE LETTER (OUTPATIENT)
Age: 64
End: 2024-10-26

## 2024-11-13 ENCOUNTER — MYC MEDICAL ADVICE (OUTPATIENT)
Dept: FAMILY MEDICINE | Facility: CLINIC | Age: 64
End: 2024-11-13
Payer: COMMERCIAL

## 2024-11-14 ENCOUNTER — TELEPHONE (OUTPATIENT)
Dept: FAMILY MEDICINE | Facility: CLINIC | Age: 64
End: 2024-11-14
Payer: COMMERCIAL

## 2024-11-14 NOTE — TELEPHONE ENCOUNTER
Prior Authorization Request  Who s requesting:  Patient  Pharmacy Name and Location: [id number]  Medication Name: rosuvastatin (CRESTOR) 5 MG tablet   Insurance Plan: [employer]  Insurance Member ID Number:  [id number]   CoverMyMeds Key:   Informed patient that prior authorizations can take up to 10 business days for response:   Yes  Okay to leave a detailed message: Yes

## 2024-11-18 NOTE — TELEPHONE ENCOUNTER
Prior Authorization Approval    Medication: ROSUVASTATIN CALCIUM 5 MG PO TABS  Authorization Effective Date: 11/18/2024  Authorization Expiration Date: 11/18/2025  Approved Dose/Quantity: 90/90  Reference #: [id number]   Insurance Company: [employer] - Phone [phone] Fax [phone]  Expected CoPay: $    CoPay Card Available:      Financial Assistance Needed:   Which Pharmacy is filling the prescription: [facility]Y #[id number] - [place] [phone]  Pharmacy Notified: Yes  Patient Notified: Yes

## 2024-12-20 DIAGNOSIS — E11.9 TYPE 2 DIABETES MELLITUS WITHOUT COMPLICATION, WITHOUT LONG-TERM CURRENT USE OF INSULIN (H): ICD-10-CM

## 2024-12-21 RX ORDER — TIRZEPATIDE 15 MG/.5ML
INJECTION, SOLUTION SUBCUTANEOUS
Qty: 6 ML | Refills: 1 | Status: SHIPPED | OUTPATIENT
Start: 2024-12-21

## 2025-01-23 ENCOUNTER — OFFICE VISIT (OUTPATIENT)
Dept: FAMILY MEDICINE | Facility: CLINIC | Age: 65
End: 2025-01-23
Payer: COMMERCIAL

## 2025-01-23 VITALS
OXYGEN SATURATION: 97 % | RESPIRATION RATE: 16 BRPM | TEMPERATURE: 98 F | WEIGHT: 198 LBS | HEIGHT: 66 IN | DIASTOLIC BLOOD PRESSURE: 83 MMHG | HEART RATE: 75 BPM | BODY MASS INDEX: 31.82 KG/M2 | SYSTOLIC BLOOD PRESSURE: 134 MMHG

## 2025-01-23 DIAGNOSIS — E11.9 TYPE 2 DIABETES MELLITUS WITHOUT COMPLICATION, WITHOUT LONG-TERM CURRENT USE OF INSULIN (H): Primary | ICD-10-CM

## 2025-01-23 DIAGNOSIS — F32.5 MAJOR DEPRESSIVE DISORDER WITH SINGLE EPISODE, IN FULL REMISSION: ICD-10-CM

## 2025-01-23 DIAGNOSIS — I10 BENIGN ESSENTIAL HYPERTENSION: ICD-10-CM

## 2025-01-23 LAB
EST. AVERAGE GLUCOSE BLD GHB EST-MCNC: 103 MG/DL
HBA1C MFR BLD: 5.2 % (ref 0–5.6)
HOLD SPECIMEN: NORMAL

## 2025-01-23 RX ORDER — AMLODIPINE BESYLATE 2.5 MG/1
2.5 TABLET ORAL DAILY
Qty: 30 TABLET | Refills: 1 | Status: SHIPPED | OUTPATIENT
Start: 2025-01-23

## 2025-01-23 RX ORDER — AMLODIPINE BESYLATE 2.5 MG/1
2.5 TABLET ORAL DAILY
Qty: 30 TABLET | Refills: 1 | Status: SHIPPED | OUTPATIENT
Start: 2025-01-23 | End: 2025-01-23

## 2025-01-23 ASSESSMENT — PATIENT HEALTH QUESTIONNAIRE - PHQ9
SUM OF ALL RESPONSES TO PHQ QUESTIONS 1-9: 3
10. IF YOU CHECKED OFF ANY PROBLEMS, HOW DIFFICULT HAVE THESE PROBLEMS MADE IT FOR YOU TO DO YOUR WORK, TAKE CARE OF THINGS AT HOME, OR GET ALONG WITH OTHER PEOPLE: NOT DIFFICULT AT ALL
SUM OF ALL RESPONSES TO PHQ QUESTIONS 1-9: 3

## 2025-01-23 NOTE — PROGRESS NOTES
"  Assessment & Plan   Assessment & Plan  Type 2 diabetes mellitus without complication, without long-term current use of insulin (H)  The patient's A1c remains quite optimal at 5.2 at this time on Mounjaro 15 mg weekly.  Continue current medication along with Crestor 5 mg daily.    Orders:    HEMOGLOBIN A1C; Future    Basic metabolic panel  (Ca, Cl, CO2, Creat, Gluc, K, Na, BUN); Future    Albumin Random Urine Quantitative with Creat Ratio; Future    Benign essential hypertension  The patient's blood pressure is not under optimal control.  I asked her to monitor her blood pressures at home and I added amlodipine 2.5 mg daily.    Orders:    amLODIPine (NORVASC) 2.5 MG tablet; Take 1 tablet (2.5 mg) by mouth daily.    Major depressive disorder with single episode, in full remission  Doing well on fluoxetine 20 mg daily.                BMI  Estimated body mass index is 31.98 kg/m  as calculated from the following:    Height as of this encounter: 1.676 m (5' 5.98\").    Weight as of this encounter: 89.8 kg (198 lb).         Ben Aggarwal is a 64 year old who presents today for routine diabetic visit.  The patient continues on Mounjaro 15 mg weekly and is tolerating it well.  She is very pleased with the resulting weight loss and feels quite good.  She has no other specific complaints or concerns today.  Diabetes      1/23/2025     3:09 PM   Additional Questions   Roomed by as   Accompanied by self         1/23/2025     3:09 PM   Patient Reported Additional Medications   Patient reports taking the following new medications no     History of Present Illness       Diabetes:   She presents for follow up of diabetes.    She is not checking blood glucose.         She has no concerns regarding her diabetes at this time.   She is not experiencing numbness or burning in feet, excessive thirst, blurry vision, weight changes or redness, sores or blisters on feet.           She eats 2-3 servings of fruits and vegetables " "daily.She consumes 0 sweetened beverage(s) daily.She exercises with enough effort to increase her heart rate 30 to 60 minutes per day.  She exercises with enough effort to increase her heart rate 5 days per week.   She is taking medications regularly.           Objective    BP (!) 166/73 (BP Location: Left arm, Patient Position: Left side, Cuff Size: Adult Large)   Pulse 75   Temp 98  F (36.7  C) (Oral)   Resp 16   Ht 1.676 m (5' 5.98\")   Wt 89.8 kg (198 lb)   SpO2 97%   BMI 31.98 kg/m    Body mass index is 31.98 kg/m .  Physical Exam   GENERAL: alert and no distress  RESP: lungs clear to auscultation - no rales, rhonchi or wheezes  CV: regular rate and rhythm, normal S1 S2, no S3 or S4, no murmur, click or rub, no peripheral edema   Diabetic foot exam: normal DP and PT pulses, no trophic changes or ulcerative lesions, and normal sensory exam    The longitudinal plan of care for the diagnosis(es)/condition(s) as documented were addressed during this visit. Due to the added complexity in care, I will continue to support Alessia in the subsequent management and with ongoing continuity of care.        Signed Electronically by: THONG MONIQUE MD    "

## 2025-01-23 NOTE — ASSESSMENT & PLAN NOTE
The patient's blood pressure is not under optimal control.  I asked her to monitor her blood pressures at home and I added amlodipine 2.5 mg daily.    Orders:    amLODIPine (NORVASC) 2.5 MG tablet; Take 1 tablet (2.5 mg) by mouth daily.

## 2025-01-23 NOTE — ASSESSMENT & PLAN NOTE
The patient's A1c remains quite optimal at 5.2 at this time on Mounjaro 15 mg weekly.  Continue current medication along with Crestor 5 mg daily.    Orders:    HEMOGLOBIN A1C; Future    Basic metabolic panel  (Ca, Cl, CO2, Creat, Gluc, K, Na, BUN); Future    Albumin Random Urine Quantitative with Creat Ratio; Future

## 2025-01-25 LAB
CREAT UR-MCNC: 247 MG/DL
MICROALBUMIN UR-MCNC: 14 MG/L
MICROALBUMIN/CREAT UR: 5.67 MG/G CR (ref 0–25)

## 2025-05-11 DIAGNOSIS — E11.9 TYPE 2 DIABETES MELLITUS WITHOUT COMPLICATION, WITHOUT LONG-TERM CURRENT USE OF INSULIN (H): ICD-10-CM

## 2025-05-12 RX ORDER — ROSUVASTATIN CALCIUM 5 MG/1
5 TABLET, COATED ORAL DAILY
Qty: 30 TABLET | Refills: 0 | Status: SHIPPED | OUTPATIENT
Start: 2025-05-12

## 2025-05-28 ENCOUNTER — OFFICE VISIT (OUTPATIENT)
Dept: FAMILY MEDICINE | Facility: CLINIC | Age: 65
End: 2025-05-28
Attending: FAMILY MEDICINE
Payer: COMMERCIAL

## 2025-05-28 VITALS
DIASTOLIC BLOOD PRESSURE: 80 MMHG | HEART RATE: 67 BPM | SYSTOLIC BLOOD PRESSURE: 131 MMHG | RESPIRATION RATE: 14 BRPM | TEMPERATURE: 97.9 F | BODY MASS INDEX: 31.72 KG/M2 | OXYGEN SATURATION: 97 % | WEIGHT: 197.4 LBS | HEIGHT: 66 IN

## 2025-05-28 DIAGNOSIS — Z13.6 ENCOUNTER FOR SCREENING FOR CORONARY ARTERY DISEASE: ICD-10-CM

## 2025-05-28 DIAGNOSIS — Z00.00 ROUTINE GENERAL MEDICAL EXAMINATION AT A HEALTH CARE FACILITY: ICD-10-CM

## 2025-05-28 DIAGNOSIS — E11.9 TYPE 2 DIABETES MELLITUS WITHOUT COMPLICATION, WITHOUT LONG-TERM CURRENT USE OF INSULIN (H): Primary | ICD-10-CM

## 2025-05-28 DIAGNOSIS — E66.09 CLASS 1 OBESITY DUE TO EXCESS CALORIES WITH SERIOUS COMORBIDITY AND BODY MASS INDEX (BMI) OF 33.0 TO 33.9 IN ADULT: ICD-10-CM

## 2025-05-28 DIAGNOSIS — Z13.6 SCREENING FOR CARDIOVASCULAR CONDITION: ICD-10-CM

## 2025-05-28 DIAGNOSIS — E66.811 CLASS 1 OBESITY DUE TO EXCESS CALORIES WITH SERIOUS COMORBIDITY AND BODY MASS INDEX (BMI) OF 33.0 TO 33.9 IN ADULT: ICD-10-CM

## 2025-05-28 DIAGNOSIS — I10 BENIGN ESSENTIAL HYPERTENSION: ICD-10-CM

## 2025-05-28 DIAGNOSIS — H61.21 IMPACTED CERUMEN OF RIGHT EAR: ICD-10-CM

## 2025-05-28 LAB
EST. AVERAGE GLUCOSE BLD GHB EST-MCNC: 100 MG/DL
HBA1C MFR BLD: 5.1 % (ref 0–5.6)
HOLD SPECIMEN: NORMAL

## 2025-05-28 PROCEDURE — 82043 UR ALBUMIN QUANTITATIVE: CPT | Performed by: FAMILY MEDICINE

## 2025-05-28 PROCEDURE — 36415 COLL VENOUS BLD VENIPUNCTURE: CPT | Performed by: FAMILY MEDICINE

## 2025-05-28 PROCEDURE — 83036 HEMOGLOBIN GLYCOSYLATED A1C: CPT | Performed by: FAMILY MEDICINE

## 2025-05-28 PROCEDURE — 82570 ASSAY OF URINE CREATININE: CPT | Performed by: FAMILY MEDICINE

## 2025-05-28 PROCEDURE — 80061 LIPID PANEL: CPT | Performed by: FAMILY MEDICINE

## 2025-05-28 SDOH — HEALTH STABILITY: PHYSICAL HEALTH: ON AVERAGE, HOW MANY DAYS PER WEEK DO YOU ENGAGE IN MODERATE TO STRENUOUS EXERCISE (LIKE A BRISK WALK)?: 5 DAYS

## 2025-05-28 ASSESSMENT — ANXIETY QUESTIONNAIRES
GAD7 TOTAL SCORE: 1
8. IF YOU CHECKED OFF ANY PROBLEMS, HOW DIFFICULT HAVE THESE MADE IT FOR YOU TO DO YOUR WORK, TAKE CARE OF THINGS AT HOME, OR GET ALONG WITH OTHER PEOPLE?: NOT DIFFICULT AT ALL
GAD7 TOTAL SCORE: 1
GAD7 TOTAL SCORE: 1
3. WORRYING TOO MUCH ABOUT DIFFERENT THINGS: NOT AT ALL
IF YOU CHECKED OFF ANY PROBLEMS ON THIS QUESTIONNAIRE, HOW DIFFICULT HAVE THESE PROBLEMS MADE IT FOR YOU TO DO YOUR WORK, TAKE CARE OF THINGS AT HOME, OR GET ALONG WITH OTHER PEOPLE: NOT DIFFICULT AT ALL
5. BEING SO RESTLESS THAT IT IS HARD TO SIT STILL: NOT AT ALL
4. TROUBLE RELAXING: NOT AT ALL
7. FEELING AFRAID AS IF SOMETHING AWFUL MIGHT HAPPEN: NOT AT ALL
7. FEELING AFRAID AS IF SOMETHING AWFUL MIGHT HAPPEN: NOT AT ALL
6. BECOMING EASILY ANNOYED OR IRRITABLE: SEVERAL DAYS
2. NOT BEING ABLE TO STOP OR CONTROL WORRYING: NOT AT ALL
1. FEELING NERVOUS, ANXIOUS, OR ON EDGE: NOT AT ALL

## 2025-05-28 ASSESSMENT — PATIENT HEALTH QUESTIONNAIRE - PHQ9
SUM OF ALL RESPONSES TO PHQ QUESTIONS 1-9: 2
10. IF YOU CHECKED OFF ANY PROBLEMS, HOW DIFFICULT HAVE THESE PROBLEMS MADE IT FOR YOU TO DO YOUR WORK, TAKE CARE OF THINGS AT HOME, OR GET ALONG WITH OTHER PEOPLE: SOMEWHAT DIFFICULT
SUM OF ALL RESPONSES TO PHQ QUESTIONS 1-9: 2

## 2025-05-28 ASSESSMENT — SOCIAL DETERMINANTS OF HEALTH (SDOH): HOW OFTEN DO YOU GET TOGETHER WITH FRIENDS OR RELATIVES?: MORE THAN THREE TIMES A WEEK

## 2025-05-28 NOTE — PATIENT INSTRUCTIONS
Patient Education   Preventive Care Advice   This is general advice given by our system to help you stay healthy. However, your care team may have specific advice just for you. Please talk to your care team about your preventive care needs.  Nutrition  Eat 5 or more servings of fruits and vegetables each day.  Try wheat bread, brown rice and whole grain pasta (instead of white bread, rice, and pasta).  Get enough calcium and vitamin D. Check the label on foods and aim for 100% of the RDA (recommended daily allowance).  Lifestyle  Exercise at least 150 minutes each week  (30 minutes a day, 5 days a week).  Do muscle strengthening activities 2 days a week. These help control your weight and prevent disease.  No smoking.  Wear sunscreen to prevent skin cancer.  Have a dental exam and cleaning every 6 months.  Yearly exams  See your health care team every year to talk about:  Any changes in your health.  Any medicines your care team has prescribed.  Preventive care, family planning, and ways to prevent chronic diseases.  Shots (vaccines)   HPV shots (up to age 26), if you've never had them before.  Hepatitis B shots (up to age 59), if you've never had them before.  COVID-19 shot: Get this shot when it's due.  Flu shot: Get a flu shot every year.  Tetanus shot: Get a tetanus shot every 10 years.  Pneumococcal, hepatitis A, and RSV shots: Ask your care team if you need these based on your risk.  Shingles shot (for age 50 and up)  General health tests  Diabetes screening:  Starting at age 35, Get screened for diabetes at least every 3 years.  If you are younger than age 35, ask your care team if you should be screened for diabetes.  Cholesterol test: At age 39, start having a cholesterol test every 5 years, or more often if advised.  Bone density scan (DEXA): At age 50, ask your care team if you should have this scan for osteoporosis (brittle bones).  Hepatitis C: Get tested at least once in your life.  STIs (sexually  transmitted infections)  Before age 24: Ask your care team if you should be screened for STIs.  After age 24: Get screened for STIs if you're at risk. You are at risk for STIs (including HIV) if:  You are sexually active with more than one person.  You don't use condoms every time.  You or a partner was diagnosed with a sexually transmitted infection.  If you are at risk for HIV, ask about PrEP medicine to prevent HIV.  Get tested for HIV at least once in your life, whether you are at risk for HIV or not.  Cancer screening tests  Cervical cancer screening: If you have a cervix, begin getting regular cervical cancer screening tests starting at age 21.  Breast cancer scan (mammogram): If you've ever had breasts, begin having regular mammograms starting at age 40. This is a scan to check for breast cancer.  Colon cancer screening: It is important to start screening for colon cancer at age 45.  Have a colonoscopy test every 10 years (or more often if you're at risk) Or, ask your provider about stool tests like a FIT test every year or Cologuard test every 3 years.  To learn more about your testing options, visit:   .  For help making a decision, visit:   https://bit.ly/zq54248.  Prostate cancer screening test: If you have a prostate, ask your care team if a prostate cancer screening test (PSA) at age 55 is right for you.  Lung cancer screening: If you are a current or former smoker ages 50 to 80, ask your care team if ongoing lung cancer screenings are right for you.  For informational purposes only. Not to replace the advice of your health care provider. Copyright   2023 [employer]. All rights reserved. Clinically reviewed by the [facility] Transitions Program. [employer] [id number] - REV 01/24.

## 2025-05-28 NOTE — PROGRESS NOTES
Preventive Care Visit  [facility]  THONG MONIQUE MD, Family Medicine  May 28, 2025  {Provider  Link to SmartSet :386181}    {PROVIDER CHARTING PREFERENCE:084320}    Ben Aggarwal is a 65 year old, presenting for the following:  Physical and Hypertension        5/28/2025     3:38 PM   Additional Questions   Roomed by as   Accompanied by self         5/28/2025     3:38 PM   Patient Reported Additional Medications   Patient reports taking the following new medications no        Via the Health Maintenance questionnaire, the patient has reported the following services have been completed -Eye Exam: [facility] 2025-06-13, this information has been sent to the abstraction team.    HPI  ***   {MA/LPN/RN Pre-Provider Visit Orders- hCG/UA/Strep (Optional):796467}  {SUPERLIST (Optional):279286}  {additonal problems for provider to add (Optional):212161}  Advance Care Planning  {The storyboard will display whether the patient has ACP docs on file. Hover over the Code section in the storyboard to access the ACP documents. :010401}  Document on file is a Health Care Directive or POLST.        5/28/2025   General Health   How would you rate your overall physical health? Good   Feel stress (tense, anxious, or unable to sleep) Only a little   (!) STRESS CONCERN      5/28/2025   Nutrition   Three or more servings of calcium each day? Yes   Diet: Low fat/cholesterol    Carbohydrate counting   How many servings of fruit and vegetables per day? 4 or more   How many sweetened beverages each day? 0-1       Multiple values from one day are sorted in reverse-chronological order         5/28/2025   Exercise   Days per week of moderate/strenous exercise 5 days         5/28/2025   Social Factors   Frequency of gathering with friends or relatives More than three times a week   Worry food won't last until get money to buy more No   Food not last or not have enough money for food? No   Do you have  housing? (Housing is defined as stable permanent housing and does not include staying outside in a car, in a tent, in an abandoned building, in an overnight shelter, or couch-surfing.) Yes   Are you worried about losing your housing? No   Lack of transportation? No   Unable to get utilities (heat,electricity)? No         5/28/2025   Fall Risk   Fallen 2 or more times in the past year? No   Trouble with walking or balance? No          5/28/2025   Dental   Dentist two times every year? Yes       Today's PHQ-9 Score:       5/28/2025     3:25 PM   PHQ-9 SCORE   PHQ-9 Total Score MyChart 2 (Minimal depression)   PHQ-9 Total Score 2        Patient-reported         5/28/2025   Substance Use   Alcohol more than 3/day or more than 7/wk No   Do you use any other substances recreationally? No     Social History     Tobacco Use    Smoking status: Never    Smokeless tobacco: Never   Substance Use Topics    Alcohol use: No    Drug use: No     {Provider  If there are gaps in the social history shown above, please follow the link to update and then refresh the note Link to Social and Substance History :928478}     {Mammogram Decision Support (Optional):758701}        5/28/2025   STI Screening   New sexual partner(s) since last STI/HIV test? No     History of abnormal Pap smear: { :925008}        Latest Ref Rng & Units 6/9/2021    10:04 AM 8/2/2016     3:14 PM   PAP / HPV   PAP Negative for squamous intraepithelial lesion or malignancy. Negative for squamous intraepithelial lesion or malignancy  Electronically signed by Chandrika Rashid CT (ASCP) on 6/16/2021 at  2:55 PM    Negative for squamous intraepithelial lesion or malignancy  Electronically signed by Jessica Strange CT (ASCP) on 8/9/2016 at  1:01 PM      HPV 16 DNA NEG Negative     HPV 18 DNA NEG Negative     Other HR HPV NEG Negative       ASCVD Risk   The 10-year ASCVD risk score (Esa MONTOYA, et al., 2019) is: 12.1%    Values used to calculate the score:    "   Age: 65 years      Sex: Female      Is Non- : No      Diabetic: Yes      Tobacco smoker: No      Systolic Blood Pressure: 155 mmHg      Is BP treated: Yes      HDL Cholesterol: 70 mg/dL      Total Cholesterol: 148 mg/dL    {Link to Fracture Risk Assessment Tool (Optional):626556}    {Provider  REQUIRED FOR AWV Use the storyboard to review patient history, after sections have been marked as reviewed, refresh note to capture documentation:150535}   Reviewed and updated as needed this visit by Provider                    {HISTORY OPTIONS (Optional):740518}    {ROS Picklists (Optional):590214}     Objective    Exam  BP (!) 155/77 (BP Location: Left arm, Patient Position: Left side, Cuff Size: Adult Large)   Pulse 67   Temp 97.9  F (36.6  C) (Oral)   Resp 14   Ht 1.666 m (5' 5.6\")   Wt 89.5 kg (197 lb 6.4 oz)   SpO2 97%   BMI 32.25 kg/m     Estimated body mass index is 32.25 kg/m  as calculated from the following:    Height as of this encounter: 1.666 m (5' 5.6\").    Weight as of this encounter: 89.5 kg (197 lb 6.4 oz).    Physical Exam  {Exam Choices (Optional):707600}        Signed Electronically by: THONG MONIQUE MD  {Email feedback regarding this note to [email or web address]   :355702}  Answers submitted by the patient for this visit:  Patient Health Questionnaire (Submitted on 5/28/2025)  If you checked off any problems, how difficult have these problems made it for you to do your work, take care of things at home, or get along with other people?: Somewhat difficult  PHQ9 TOTAL SCORE: 2  Patient Health Questionnaire (G7) (Submitted on 5/28/2025)  KRUPA 7 TOTAL SCORE: 1    "

## 2025-05-29 ENCOUNTER — RESULTS FOLLOW-UP (OUTPATIENT)
Dept: FAMILY MEDICINE | Facility: CLINIC | Age: 65
End: 2025-05-29

## 2025-05-29 LAB
CHOLEST SERPL-MCNC: 149 MG/DL
CREAT UR-MCNC: 199 MG/DL
HDLC SERPL-MCNC: 72 MG/DL
LDLC SERPL CALC-MCNC: 63 MG/DL
MICROALBUMIN UR-MCNC: <12 MG/L
MICROALBUMIN/CREAT UR: NORMAL MG/G{CREAT}
NONHDLC SERPL-MCNC: 77 MG/DL
TRIGL SERPL-MCNC: 68 MG/DL

## 2025-06-04 ENCOUNTER — MYC REFILL (OUTPATIENT)
Dept: FAMILY MEDICINE | Facility: CLINIC | Age: 65
End: 2025-06-04
Payer: COMMERCIAL

## 2025-06-04 DIAGNOSIS — E11.9 TYPE 2 DIABETES MELLITUS WITHOUT COMPLICATION, WITHOUT LONG-TERM CURRENT USE OF INSULIN (H): ICD-10-CM

## 2025-06-04 RX ORDER — TIRZEPATIDE 15 MG/.5ML
INJECTION, SOLUTION SUBCUTANEOUS
Qty: 6 ML | Refills: 1 | OUTPATIENT
Start: 2025-06-04

## 2025-06-06 ENCOUNTER — TRANSFERRED RECORDS (OUTPATIENT)
Dept: HEALTH INFORMATION MANAGEMENT | Facility: CLINIC | Age: 65
End: 2025-06-06
Payer: COMMERCIAL

## 2025-06-06 LAB — RETINOPATHY: NEGATIVE

## 2025-06-07 DIAGNOSIS — E11.9 TYPE 2 DIABETES MELLITUS WITHOUT COMPLICATION, WITHOUT LONG-TERM CURRENT USE OF INSULIN (H): ICD-10-CM

## 2025-06-09 ENCOUNTER — HOSPITAL ENCOUNTER (OUTPATIENT)
Dept: CT IMAGING | Facility: CLINIC | Age: 65
Discharge: HOME | End: 2025-06-09
Attending: FAMILY MEDICINE | Admitting: FAMILY MEDICINE
Payer: COMMERCIAL

## 2025-06-09 DIAGNOSIS — E11.9 TYPE 2 DIABETES MELLITUS WITHOUT COMPLICATION, WITHOUT LONG-TERM CURRENT USE OF INSULIN (H): ICD-10-CM

## 2025-06-09 DIAGNOSIS — Z13.6 ENCOUNTER FOR SCREENING FOR CORONARY ARTERY DISEASE: ICD-10-CM

## 2025-06-09 PROCEDURE — 75571 CT HRT W/O DYE W/CA TEST: CPT

## 2025-06-09 PROCEDURE — 75571 CT HRT W/O DYE W/CA TEST: CPT | Mod: 26 | Performed by: INTERNAL MEDICINE

## 2025-06-09 RX ORDER — ROSUVASTATIN CALCIUM 5 MG/1
5 TABLET, COATED ORAL DAILY
Qty: 90 TABLET | Refills: 2 | Status: SHIPPED | OUTPATIENT
Start: 2025-06-09

## 2025-06-10 LAB
CV CALCIUM SCORE AGATSTON LM: 0
CV CALCIUM SCORING AGATSON LAD: 40
CV CALCIUM SCORING AGATSTON CX: 0
CV CALCIUM SCORING AGATSTON RCA: 44
CV CALCIUM SCORING AGATSTON TOTAL: 84

## 2025-06-18 ENCOUNTER — TRANSFERRED RECORDS (OUTPATIENT)
Dept: HEALTH INFORMATION MANAGEMENT | Facility: CLINIC | Age: 65
End: 2025-06-18
Payer: COMMERCIAL

## 2025-08-27 ENCOUNTER — MYC MEDICAL ADVICE (OUTPATIENT)
Dept: FAMILY MEDICINE | Facility: CLINIC | Age: 65
End: 2025-08-27
Payer: COMMERCIAL

## 2025-08-27 DIAGNOSIS — I10 BENIGN ESSENTIAL HYPERTENSION: Primary | ICD-10-CM

## 2025-08-28 RX ORDER — HYDROCHLOROTHIAZIDE 12.5 MG/1
12.5 TABLET ORAL DAILY
Qty: 30 TABLET | Refills: 1 | Status: SHIPPED | OUTPATIENT
Start: 2025-08-28